# Patient Record
Sex: FEMALE | Race: WHITE | NOT HISPANIC OR LATINO | Employment: FULL TIME | URBAN - METROPOLITAN AREA
[De-identification: names, ages, dates, MRNs, and addresses within clinical notes are randomized per-mention and may not be internally consistent; named-entity substitution may affect disease eponyms.]

---
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
## 2020-04-06 ENCOUNTER — APPOINTMENT (OUTPATIENT)
Dept: CT IMAGING | Facility: HOSPITAL | Age: 20
End: 2020-04-06

## 2020-04-06 ENCOUNTER — HOSPITAL ENCOUNTER (EMERGENCY)
Facility: HOSPITAL | Age: 20
Discharge: HOME | End: 2020-04-06
Attending: EMERGENCY MEDICINE

## 2020-04-06 VITALS
TEMPERATURE: 96.1 F | RESPIRATION RATE: 14 BRPM | OXYGEN SATURATION: 100 % | WEIGHT: 124 LBS | SYSTOLIC BLOOD PRESSURE: 145 MMHG | HEIGHT: 60 IN | HEART RATE: 84 BPM | BODY MASS INDEX: 24.35 KG/M2 | DIASTOLIC BLOOD PRESSURE: 77 MMHG

## 2020-04-06 DIAGNOSIS — R10.31 RIGHT LOWER QUADRANT ABDOMINAL PAIN: Primary | ICD-10-CM

## 2020-04-06 LAB
ALBUMIN SERPL-MCNC: 4.6 G/DL (ref 3.5–5.2)
ALBUMIN/GLOB SERPL: 1.6 G/DL
ALP SERPL-CCNC: 72 U/L (ref 39–117)
ALT SERPL W P-5'-P-CCNC: 10 U/L (ref 1–33)
ANION GAP SERPL CALCULATED.3IONS-SCNC: 10.9 MMOL/L (ref 5–15)
AST SERPL-CCNC: 15 U/L (ref 1–32)
B-HCG UR QL: NEGATIVE
BASOPHILS # BLD AUTO: 0.05 10*3/MM3 (ref 0–0.2)
BASOPHILS NFR BLD AUTO: 0.6 % (ref 0–1.5)
BILIRUB SERPL-MCNC: 0.4 MG/DL (ref 0.2–1.2)
BILIRUB UR QL STRIP: NEGATIVE
BUN BLD-MCNC: 13 MG/DL (ref 6–20)
BUN/CREAT SERPL: 12.7 (ref 7–25)
CALCIUM SPEC-SCNC: 9.9 MG/DL (ref 8.6–10.5)
CHLORIDE SERPL-SCNC: 101 MMOL/L (ref 98–107)
CLARITY UR: CLEAR
CO2 SERPL-SCNC: 25.1 MMOL/L (ref 22–29)
COLOR UR: YELLOW
CREAT BLD-MCNC: 1.02 MG/DL (ref 0.57–1)
DEPRECATED RDW RBC AUTO: 38.5 FL (ref 37–54)
EOSINOPHIL # BLD AUTO: 0.07 10*3/MM3 (ref 0–0.4)
EOSINOPHIL NFR BLD AUTO: 0.8 % (ref 0.3–6.2)
ERYTHROCYTE [DISTWIDTH] IN BLOOD BY AUTOMATED COUNT: 11.9 % (ref 12.3–15.4)
GFR SERPL CREATININE-BSD FRML MDRD: 70 ML/MIN/1.73
GLOBULIN UR ELPH-MCNC: 2.8 GM/DL
GLUCOSE BLD-MCNC: 90 MG/DL (ref 65–99)
GLUCOSE UR STRIP-MCNC: NEGATIVE MG/DL
HCT VFR BLD AUTO: 37.6 % (ref 34–46.6)
HGB BLD-MCNC: 12.6 G/DL (ref 12–15.9)
HGB UR QL STRIP.AUTO: NEGATIVE
IMM GRANULOCYTES # BLD AUTO: 0.02 10*3/MM3 (ref 0–0.05)
IMM GRANULOCYTES NFR BLD AUTO: 0.2 % (ref 0–0.5)
KETONES UR QL STRIP: NEGATIVE
LEUKOCYTE ESTERASE UR QL STRIP.AUTO: NEGATIVE
LYMPHOCYTES # BLD AUTO: 3.34 10*3/MM3 (ref 0.7–3.1)
LYMPHOCYTES NFR BLD AUTO: 40.3 % (ref 19.6–45.3)
MCH RBC QN AUTO: 30.1 PG (ref 26.6–33)
MCHC RBC AUTO-ENTMCNC: 33.5 G/DL (ref 31.5–35.7)
MCV RBC AUTO: 89.7 FL (ref 79–97)
MONOCYTES # BLD AUTO: 0.7 10*3/MM3 (ref 0.1–0.9)
MONOCYTES NFR BLD AUTO: 8.4 % (ref 5–12)
NEUTROPHILS # BLD AUTO: 4.11 10*3/MM3 (ref 1.7–7)
NEUTROPHILS NFR BLD AUTO: 49.7 % (ref 42.7–76)
NITRITE UR QL STRIP: NEGATIVE
NRBC BLD AUTO-RTO: 0 /100 WBC (ref 0–0.2)
PH UR STRIP.AUTO: 7 [PH] (ref 4.5–8)
PLATELET # BLD AUTO: 185 10*3/MM3 (ref 140–450)
PMV BLD AUTO: 10.9 FL (ref 6–12)
POTASSIUM BLD-SCNC: 4.4 MMOL/L (ref 3.5–5.2)
PROT SERPL-MCNC: 7.4 G/DL (ref 6–8.5)
PROT UR QL STRIP: NEGATIVE
RBC # BLD AUTO: 4.19 10*6/MM3 (ref 3.77–5.28)
SODIUM BLD-SCNC: 137 MMOL/L (ref 136–145)
SP GR UR STRIP: 1.02 (ref 1–1.03)
UROBILINOGEN UR QL STRIP: NORMAL
WBC NRBC COR # BLD: 8.29 10*3/MM3 (ref 3.4–10.8)

## 2020-04-06 PROCEDURE — 80053 COMPREHEN METABOLIC PANEL: CPT | Performed by: PHYSICIAN ASSISTANT

## 2020-04-06 PROCEDURE — 25010000002 ONDANSETRON PER 1 MG: Performed by: PHYSICIAN ASSISTANT

## 2020-04-06 PROCEDURE — 96375 TX/PRO/DX INJ NEW DRUG ADDON: CPT

## 2020-04-06 PROCEDURE — 99284 EMERGENCY DEPT VISIT MOD MDM: CPT

## 2020-04-06 PROCEDURE — 25010000002 FENTANYL CITRATE (PF) 100 MCG/2ML SOLUTION: Performed by: EMERGENCY MEDICINE

## 2020-04-06 PROCEDURE — 85025 COMPLETE CBC W/AUTO DIFF WBC: CPT | Performed by: PHYSICIAN ASSISTANT

## 2020-04-06 PROCEDURE — 74177 CT ABD & PELVIS W/CONTRAST: CPT

## 2020-04-06 PROCEDURE — 96374 THER/PROPH/DIAG INJ IV PUSH: CPT

## 2020-04-06 PROCEDURE — 25010000002 IOPAMIDOL 61 % SOLUTION: Performed by: EMERGENCY MEDICINE

## 2020-04-06 PROCEDURE — 99284 EMERGENCY DEPT VISIT MOD MDM: CPT | Performed by: PHYSICIAN ASSISTANT

## 2020-04-06 PROCEDURE — 81025 URINE PREGNANCY TEST: CPT | Performed by: PHYSICIAN ASSISTANT

## 2020-04-06 PROCEDURE — 81003 URINALYSIS AUTO W/O SCOPE: CPT | Performed by: PHYSICIAN ASSISTANT

## 2020-04-06 RX ORDER — FENTANYL CITRATE 50 UG/ML
25 INJECTION, SOLUTION INTRAMUSCULAR; INTRAVENOUS ONCE
Status: COMPLETED | OUTPATIENT
Start: 2020-04-06 | End: 2020-04-06

## 2020-04-06 RX ORDER — ONDANSETRON 4 MG/1
4 TABLET, ORALLY DISINTEGRATING ORAL 4 TIMES DAILY PRN
Qty: 20 TABLET | Refills: 0 | Status: SHIPPED | OUTPATIENT
Start: 2020-04-06 | End: 2020-07-29

## 2020-04-06 RX ORDER — SODIUM CHLORIDE 0.9 % (FLUSH) 0.9 %
10 SYRINGE (ML) INJECTION AS NEEDED
Status: DISCONTINUED | OUTPATIENT
Start: 2020-04-06 | End: 2020-04-06 | Stop reason: HOSPADM

## 2020-04-06 RX ORDER — ONDANSETRON 2 MG/ML
4 INJECTION INTRAMUSCULAR; INTRAVENOUS ONCE
Status: COMPLETED | OUTPATIENT
Start: 2020-04-06 | End: 2020-04-06

## 2020-04-06 RX ORDER — METHYLPREDNISOLONE SODIUM SUCCINATE 125 MG/2ML
125 INJECTION, POWDER, LYOPHILIZED, FOR SOLUTION INTRAMUSCULAR; INTRAVENOUS ONCE
Status: DISCONTINUED | OUTPATIENT
Start: 2020-04-06 | End: 2020-04-06

## 2020-04-06 RX ADMIN — ONDANSETRON 4 MG: 2 INJECTION, SOLUTION INTRAMUSCULAR; INTRAVENOUS at 20:35

## 2020-04-06 RX ADMIN — FENTANYL CITRATE 25 MCG: 50 INJECTION, SOLUTION INTRAMUSCULAR; INTRAVENOUS at 20:35

## 2020-04-06 RX ADMIN — IOPAMIDOL 100 ML: 612 INJECTION, SOLUTION INTRAVENOUS at 21:08

## 2020-04-07 NOTE — ED PROVIDER NOTES
Subjective   History of Present Illness  History of Present Illness    Chief complaint: abd pain    Location: RLQ without radiation    Quality/Severity: Pressure.  Varies.  Currently moderate    Timing/Duration: 2 days.  Was intermittent, now constant all day today    Modifying Factors: Movement makes worse.  Nothing makes better.    Associated Symptoms: Positive nausea and vomiting x1.  Denies hematemesis. denies diarrhea.  Positive urinary Markus and urgency.  Denies dysuria or hematuria.  Denies flank pain.  Denies chest pain or shortness of breath.  Denies cough.  Denies fevers or chills.  Denies vaginal discharge.    Narrative: 18-year-old female presents with right lower quadrant pain as above.  She denies any travel.  She denies any risk for STDs and declines pelvic exam.    Review of Systems   Constitutional: Negative.  Negative for chills and fever.   HENT: Negative.    Respiratory: Negative.  Negative for cough and shortness of breath.    Cardiovascular: Negative.  Negative for chest pain.   Gastrointestinal: Positive for abdominal pain, nausea and vomiting. Negative for blood in stool and diarrhea.   Genitourinary: Positive for frequency and urgency. Negative for dysuria, hematuria, vaginal bleeding and vaginal discharge.   Musculoskeletal: Negative.    Skin: Negative.    Neurological: Negative.    Hematological: Negative.    Psychiatric/Behavioral: Negative.    All other systems reviewed and are negative.      Past Medical History:   Diagnosis Date   • Anxiety    • Cervical dystonia     dx about 1 year ago (2017)   • Concussion     3/2018, hit left side of head on door   • Hypertension    • Torticollis 10/2016       No Known Allergies    Past Surgical History:   Procedure Laterality Date   • HERNIA REPAIR  2005       Family History   Problem Relation Age of Onset   • No Known Problems Mother    • No Known Problems Father        Social History     Socioeconomic History   • Marital status: Significant  "Other     Spouse name: Not on file   • Number of children: Not on file   • Years of education: Not on file   • Highest education level: Not on file   Tobacco Use   • Smoking status: Current Every Day Smoker     Types: Electronic Cigarette   • Smokeless tobacco: Never Used   • Tobacco comment: \"I stopped vaping yesterday\". States stoppede in Dec   Substance and Sexual Activity   • Alcohol use: No   • Drug use: No   • Sexual activity: Defer       Current Facility-Administered Medications:   •  fentaNYL citrate (PF) (SUBLIMAZE) injection 25 mcg, 25 mcg, Intravenous, Once, Roly Cabello MD  •  ondansetron (ZOFRAN) injection 4 mg, 4 mg, Intravenous, Once, Renae Mace PA-C  •  Insert peripheral IV, , , Once **AND** sodium chloride 0.9 % flush 10 mL, 10 mL, Intravenous, PRN, Renae Mace PA-C  No current outpatient medications on file.        Objective   Physical Exam  Vitals:    04/06/20 2005   BP: 125/79   Pulse: 86   Resp: 12   Temp: 96.1 °F (35.6 °C)   SpO2: 100%     GENERAL: Alert and oriented ×4.  No apparent distress.  SKIN: Warm, pink and dry  HEENT: Atraumatic normocephalic  LUNGS: Clear to auscultation bilaterally without wheezes, rales or rhonchi  CARDIAC: Regular rate and rhythm.  S1 and S2.  No murmurs, rubs or gallops.  ABD: Soft, mildly tender right lower quadrant.  No guarding or rebound tenderness.  Negative Rovsing's.  Negative heel jar.  Normal bowel sounds.  No CVA tenderness.  PELVIC: declined.  M/S: MAEW, no deformity  PSYCH: Normal mood and affect    Procedures           ED Course  ED Course as of Apr 06 2130 Mon Apr 06, 2020 2017 Zofran,  fentanyl given    [[place]]   2055 stable   Creatinine(!): 1.02 [KY]   2124 Patient is feeling better.  Pain has resolved.  Abdominal exam benign.  Educated patient.    Discussed pertinent labs and imaging findings with the patient/family.  Patient/Family voiced understanding of need to follow-up for recheck, further testing as needed.  Return to the " emergency Department warnings were given.    Discharged with Zofran    [KY]      ED Course User Index  [KY] Renae Mace PA-C      Reviewed CT a/p. Independently viewed by me. Interpreted by radiologist. Discussed with pt.  Ct Abdomen Pelvis With Contrast    Result Date: 4/6/2020  Narrative: PROCEDURE: CT Abdomen Pelvis W INDICATION:   Abdominal pain. RLQ pain x 2 days, n/v x 1 today. Has hx of kidney stones. Normal WBC, negative for hematuria, HCG negative/LMP 3-22-20. Sx hx: hernia repair. PPE documentation: tech wore gloves, surgical mask and goggles, pt wore surgical mask TECHNIQUE:  CT  abdomen and pelvis are performed after IV contrast.  No oral contrast given by request.  Coronal and sagittal reconstructions were obtained.  Radiation dose reduction techniques included automated exposure control or exposure modulation based on body size. Count of known CT and cardiac nuc med studies performed in previous 12 months: 0. .  COMPARISON: December 2018 FINDINGS: Artifacts: Mild motion artifact present.. Limited bowel assessment without enteric contrast. Mesentery/peritoneal cavity:  No inflammatory changes or stranding identified. Appendix normal Liver:  Within normal limits  Spleen:  Within normal limits  Kidneys: Marked scarring of the left renal cortex noted. Area of decreased enhancement in the upper pole posterior cortex left kidney is compatible with a small cyst or calyceal diverticulum. Minor scarring of the right kidney seen. Left kidney is atrophic measuring just under 8 cm in length, right kidney 9.3 cm in length. Pancreas:  Within normal limits  Gallbladder/ bile ducts:  No gross gallstones seen.  No biliary dilatation noted. Adrenal glands:  Nonenlarged.  Pelvis: Uterus and adnexa are within normal limits for age. Small amount of free fluid seen in the pelvis, etiology uncertain Prior to the Osseous structures:  No acute osseous abnormality seen.      Impression: No acute process. Small amount of  free fluid in the pelvis is nonspecific and could be physiologic or related to rupture of an ovarian hemorrhagic cyst.   Signer Name: Juanita Hull MD  Signed: 4/6/2020 8:21 PM  Workstation Name: [id number]  [facility]          Results for orders placed or performed during the hospital encounter of 04/06/20   Comprehensive Metabolic Panel   Result Value Ref Range    Glucose 90 65 - 99 mg/dL    BUN 13 6 - 20 mg/dL    Creatinine 1.02 (H) 0.57 - 1.00 mg/dL    Sodium 137 136 - 145 mmol/L    Potassium 4.4 3.5 - 5.2 mmol/L    Chloride 101 98 - 107 mmol/L    CO2 25.1 22.0 - 29.0 mmol/L    Calcium 9.9 8.6 - 10.5 mg/dL    Total Protein 7.4 6.0 - 8.5 g/dL    Albumin 4.60 3.50 - 5.20 g/dL    ALT (SGPT) 10 1 - 33 U/L    AST (SGOT) 15 1 - 32 U/L    Alkaline Phosphatase 72 39 - 117 U/L    Total Bilirubin 0.4 0.2 - 1.2 mg/dL    eGFR Non African Amer 70 >60 mL/min/1.73    Globulin 2.8 gm/dL    A/G Ratio 1.6 g/dL    BUN/Creatinine Ratio 12.7 7.0 - 25.0    Anion Gap 10.9 5.0 - 15.0 mmol/L   Pregnancy, Urine - Urine, Clean Catch   Result Value Ref Range    HCG, Urine QL Negative Negative   Urinalysis With Culture If Indicated - Urine, Clean Catch   Result Value Ref Range    Color, UA Yellow Yellow, Straw    Appearance, UA Clear Clear    pH, UA 7.0 4.5 - 8.0    Specific Gravity, UA 1.020 1.003 - 1.030    Glucose, UA Negative Negative    Ketones, UA Negative Negative    Bilirubin, UA Negative Negative    Blood, UA Negative Negative    Protein, UA Negative Negative    Leuk Esterase, UA Negative Negative    Nitrite, UA Negative Negative    Urobilinogen, UA 0.2 E.U./dL 0.2 - 1.0 E.U./dL   CBC Auto Differential   Result Value Ref Range    WBC 8.29 3.40 - 10.80 10*3/mm3    RBC 4.19 3.77 - 5.28 10*6/mm3    Hemoglobin 12.6 12.0 - 15.9 g/dL    Hematocrit 37.6 34.0 - 46.6 %    MCV 89.7 79.0 - 97.0 fL    MCH 30.1 26.6 - 33.0 pg    MCHC 33.5 31.5 - 35.7 g/dL    RDW 11.9 (L) 12.3 - 15.4 %    RDW-SD 38.5 37.0 - 54.0 fl     MPV 10.9 6.0 - 12.0 fL    Platelets 185 140 - 450 10*3/mm3    Neutrophil % 49.7 42.7 - 76.0 %    Lymphocyte % 40.3 19.6 - 45.3 %    Monocyte % 8.4 5.0 - 12.0 %    Eosinophil % 0.8 0.3 - 6.2 %    Basophil % 0.6 0.0 - 1.5 %    Immature Grans % 0.2 0.0 - 0.5 %    Neutrophils, Absolute 4.11 1.70 - 7.00 10*3/mm3    Lymphocytes, Absolute 3.34 (H) 0.70 - 3.10 10*3/mm3    Monocytes, Absolute 0.70 0.10 - 0.90 10*3/mm3    Eosinophils, Absolute 0.07 0.00 - 0.40 10*3/mm3    Basophils, Absolute 0.05 0.00 - 0.20 10*3/mm3    Immature Grans, Absolute 0.02 0.00 - 0.05 10*3/mm3    nRBC 0.0 0.0 - 0.2 /100 WBC                 MDM  My differential diagnosis for abdominal pain includes but is not limited to:  Gastritis, gastroenteritis, peptic ulcer disease, GERD, esophageal perforation, acute appendicitis, mesenteric adenitis, Meckel’s diverticulum, epiploic appendagitis, diverticulitis, colon cancer, ulcerative colitis, Crohn’s disease, intussusception, small bowel obstruction, adhesions, ischemic bowel, perforated viscus, ileus, obstipation, biliary colic, cholecystitis, cholelithiasis, Ebenezer-Norberto David, hepatitis, pancreatitis, common bile duct obstruction, cholangitis, bile leak, splenic trauma, splenic rupture, splenic infarction, splenic abscess, abdominal abscess, ascites, spontaneous bacterial peritonitis, hernia, UTI, cystitis,ureterolithiasis, urinary obstruction, ovarian cyst, torsion, pregnancy, ectopic pregnancy, PID, pelvic abscess, mittelschmerz, endometriosis, AAA, myocardial infarction, pneumonia, cancer, porphyria, DKA, medications, sickle cell, viral syndrome, zoster    Final diagnoses:   Right lower quadrant abdominal pain       Dictated utilizing Dragon dictation       Renae Mace PA-C  04/06/20 [id number]

## 2020-04-07 NOTE — DISCHARGE INSTRUCTIONS
Return to the emergency department with worsening symptoms, uncontrolled pain, inability to tolerate oral liquids, fever greater than 101°F not controlled by Tylenol or as needed with emergent concerns.    Clear liquid diet.  Advance to bland as tolerated, then regular

## 2020-04-07 NOTE — ED NOTES
Patient education thoroughly reviewed.  Patient able to teach back new medications, how to obtain and take as well as potential side effects.  Patient able to teach back follow up recommendations and signs and symptoms to seek further medical care.      Patient discharged in stable condition per self ambulation to private transportation        Gustabo Terrazas RN  04/06/20 5827

## 2021-04-25 ENCOUNTER — APPOINTMENT (OUTPATIENT)
Dept: CT IMAGING | Facility: HOSPITAL | Age: 21
End: 2021-04-25

## 2021-04-25 ENCOUNTER — HOSPITAL ENCOUNTER (EMERGENCY)
Facility: HOSPITAL | Age: 21
Discharge: HOME | End: 2021-04-25
Attending: EMERGENCY MEDICINE | Admitting: EMERGENCY MEDICINE

## 2021-04-25 VITALS
DIASTOLIC BLOOD PRESSURE: 93 MMHG | BODY MASS INDEX: 21.22 KG/M2 | SYSTOLIC BLOOD PRESSURE: 127 MMHG | HEIGHT: 64 IN | HEART RATE: 73 BPM | TEMPERATURE: 98.1 F | RESPIRATION RATE: 14 BRPM | WEIGHT: 124.3 LBS | OXYGEN SATURATION: 98 %

## 2021-04-25 DIAGNOSIS — R55 SYNCOPE AND COLLAPSE: Primary | ICD-10-CM

## 2021-04-25 LAB
ALBUMIN SERPL-MCNC: 4.4 G/DL (ref 3.5–5.2)
ALBUMIN/GLOB SERPL: 1.8 G/DL
ALP SERPL-CCNC: 76 U/L (ref 39–117)
ALT SERPL W P-5'-P-CCNC: 20 U/L (ref 1–33)
ANION GAP SERPL CALCULATED.3IONS-SCNC: 8.5 MMOL/L (ref 5–15)
AST SERPL-CCNC: 21 U/L (ref 1–32)
B-HCG UR QL: NEGATIVE
BASOPHILS # BLD AUTO: 0.04 10*3/MM3 (ref 0–0.2)
BASOPHILS NFR BLD AUTO: 0.6 % (ref 0–1.5)
BILIRUB SERPL-MCNC: 0.3 MG/DL (ref 0–1.2)
BILIRUB UR QL STRIP: NEGATIVE
BUN SERPL-MCNC: 20 MG/DL (ref 6–20)
BUN/CREAT SERPL: 17.1 (ref 7–25)
CALCIUM SPEC-SCNC: 9.4 MG/DL (ref 8.6–10.5)
CHLORIDE SERPL-SCNC: 105 MMOL/L (ref 98–107)
CLARITY UR: CLEAR
CO2 SERPL-SCNC: 27.5 MMOL/L (ref 22–29)
COLOR UR: YELLOW
CREAT SERPL-MCNC: 1.17 MG/DL (ref 0.57–1)
DEPRECATED RDW RBC AUTO: 42.6 FL (ref 37–54)
EOSINOPHIL # BLD AUTO: 0.06 10*3/MM3 (ref 0–0.4)
EOSINOPHIL NFR BLD AUTO: 0.9 % (ref 0.3–6.2)
ERYTHROCYTE [DISTWIDTH] IN BLOOD BY AUTOMATED COUNT: 12.5 % (ref 12.3–15.4)
GFR SERPL CREATININE-BSD FRML MDRD: 59 ML/MIN/1.73
GLOBULIN UR ELPH-MCNC: 2.4 GM/DL
GLUCOSE SERPL-MCNC: 90 MG/DL (ref 65–99)
GLUCOSE UR STRIP-MCNC: NEGATIVE MG/DL
HCT VFR BLD AUTO: 37.8 % (ref 34–46.6)
HGB BLD-MCNC: 12.2 G/DL (ref 12–15.9)
HGB UR QL STRIP.AUTO: NEGATIVE
IMM GRANULOCYTES # BLD AUTO: 0.01 10*3/MM3 (ref 0–0.05)
IMM GRANULOCYTES NFR BLD AUTO: 0.1 % (ref 0–0.5)
KETONES UR QL STRIP: NEGATIVE
LEUKOCYTE ESTERASE UR QL STRIP.AUTO: NEGATIVE
LYMPHOCYTES # BLD AUTO: 2.27 10*3/MM3 (ref 0.7–3.1)
LYMPHOCYTES NFR BLD AUTO: 32.8 % (ref 19.6–45.3)
MCH RBC QN AUTO: 30 PG (ref 26.6–33)
MCHC RBC AUTO-ENTMCNC: 32.3 G/DL (ref 31.5–35.7)
MCV RBC AUTO: 93.1 FL (ref 79–97)
MONOCYTES # BLD AUTO: 0.52 10*3/MM3 (ref 0.1–0.9)
MONOCYTES NFR BLD AUTO: 7.5 % (ref 5–12)
NEUTROPHILS NFR BLD AUTO: 4.02 10*3/MM3 (ref 1.7–7)
NEUTROPHILS NFR BLD AUTO: 58.1 % (ref 42.7–76)
NITRITE UR QL STRIP: NEGATIVE
NRBC BLD AUTO-RTO: 0 /100 WBC (ref 0–0.2)
PH UR STRIP.AUTO: 7 [PH] (ref 4.5–8)
PLATELET # BLD AUTO: 218 10*3/MM3 (ref 140–450)
PMV BLD AUTO: 11.2 FL (ref 6–12)
POTASSIUM SERPL-SCNC: 3.9 MMOL/L (ref 3.5–5.2)
PROT SERPL-MCNC: 6.8 G/DL (ref 6–8.5)
PROT UR QL STRIP: NEGATIVE
RBC # BLD AUTO: 4.06 10*6/MM3 (ref 3.77–5.28)
SODIUM SERPL-SCNC: 141 MMOL/L (ref 136–145)
SP GR UR STRIP: 1.02 (ref 1–1.03)
UROBILINOGEN UR QL STRIP: NORMAL
WBC # BLD AUTO: 6.92 10*3/MM3 (ref 3.4–10.8)

## 2021-04-25 PROCEDURE — 93005 ELECTROCARDIOGRAM TRACING: CPT | Performed by: EMERGENCY MEDICINE

## 2021-04-25 PROCEDURE — 99283 EMERGENCY DEPT VISIT LOW MDM: CPT

## 2021-04-25 PROCEDURE — 80053 COMPREHEN METABOLIC PANEL: CPT | Performed by: PHYSICIAN ASSISTANT

## 2021-04-25 PROCEDURE — 81025 URINE PREGNANCY TEST: CPT | Performed by: PHYSICIAN ASSISTANT

## 2021-04-25 PROCEDURE — 81003 URINALYSIS AUTO W/O SCOPE: CPT | Performed by: PHYSICIAN ASSISTANT

## 2021-04-25 PROCEDURE — 85025 COMPLETE CBC W/AUTO DIFF WBC: CPT | Performed by: PHYSICIAN ASSISTANT

## 2021-04-25 PROCEDURE — 93010 ELECTROCARDIOGRAM REPORT: CPT | Performed by: INTERNAL MEDICINE

## 2021-04-25 PROCEDURE — 70450 CT HEAD/BRAIN W/O DYE: CPT

## 2021-04-25 NOTE — ED NOTES
Pt reports that she does not know the names or doses of her medications     Caitie Milner, RN  04/25/21 1932

## 2021-04-25 NOTE — ED NOTES
Unable to verify medications. Pt fills at [facility] in [place] but that pharmacy is closed at this time.     Caitie Milner, RN  04/25/21 1938

## 2021-04-26 LAB — QT INTERVAL: 352 MS

## 2021-04-26 NOTE — ED PROVIDER NOTES
EMERGENCY DEPARTMENT ENCOUNTER      Room Number: 12/12    History is provided by the patient, no translation services needed    HPI:    Chief complaint: Syncope    Location: At work    Quality/Severity:      Timing/Duration: Today/twice    Modifying Factors: After history of a tic    Associated Symptoms: Headache    Narrative: Pt is a 20 y.o. female who presents complaining of 2 syncopal episodes today.  Patient states that one was while she was playing pool.  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  Patient states that she did not fall and hit her head due to the fact that someone caught her each time.  Patient states that she has a history of anxiety which induces a tic which caused her to pass out.  Patient is also complaining of a headache that began today.      PMD: Preston Coombs MD    REVIEW OF SYSTEMS  Review of Systems   Constitutional: Negative for chills and fever.   Eyes: Negative for pain and visual disturbance.   Respiratory: Negative for cough and shortness of breath.    Cardiovascular: Negative for chest pain and leg swelling.   Gastrointestinal: Negative for abdominal pain, constipation, diarrhea, nausea and vomiting.   Genitourinary: Negative for dysuria and flank pain.   Musculoskeletal: Negative for arthralgias and myalgias.   Skin: Negative for rash and wound.   Neurological: Positive for syncope and headaches. Negative for dizziness.   Psychiatric/Behavioral: Negative for suicidal ideas. The patient is not nervous/anxious.          PAST MEDICAL HISTORY  Active Ambulatory Problems     Diagnosis Date Noted   • No Active Ambulatory Problems     Resolved Ambulatory Problems     Diagnosis Date Noted   • No Resolved Ambulatory Problems     Past Medical History:   Diagnosis Date   • Anxiety    • Cervical dystonia    • Concussion    • Hypertension    • Torticollis 10/2016   • Tourette's        PAST SURGICAL HISTORY  Past Surgical History:   Procedure Laterality Date   • HERNIA  "REPAIR  2005       FAMILY HISTORY  Family History   Problem Relation Age of Onset   • No Known Problems Mother    • No Known Problems Father        SOCIAL HISTORY  Social History     Socioeconomic History   • Marital status: Significant Other     Spouse name: Not on file   • Number of children: Not on file   • Years of education: Not on file   • Highest education level: Not on file   Tobacco Use   • Smoking status: Former Smoker     Types: Electronic Cigarette   • Smokeless tobacco: Never Used   • Tobacco comment: \"I stopped vaping yesterday\". States stoppede in Dec   Vaping Use   • Vaping Use: Never used   Substance and Sexual Activity   • Alcohol use: No   • Drug use: No   • Sexual activity: Defer       ALLERGIES  Patient has no known allergies.    No current facility-administered medications for this encounter.  No current outpatient medications on file.    PHYSICAL EXAM  ED Triage Vitals   Temp Heart Rate Resp BP SpO2   04/25/21 1929 04/25/21 1929 04/25/21 1929 04/25/21 1929 04/25/21 1929   98.1 °F (36.7 °C) 95 14 144/94 100 %      Temp src Heart Rate Source Patient Position BP Location FiO2 (%)   04/25/21 1929 04/25/21 1929 04/25/21 1933 04/25/21 1929 --   Oral Monitor Lying Right arm        Physical Exam  Vitals and nursing note reviewed.   HENT:      Head: Normocephalic and atraumatic.   Eyes:      Conjunctiva/sclera: Conjunctivae normal.   Cardiovascular:      Rate and Rhythm: Normal rate and regular rhythm.      Heart sounds: Normal heart sounds.   Pulmonary:      Effort: Pulmonary effort is normal. No respiratory distress.      Breath sounds: Normal breath sounds.   Abdominal:      General: Bowel sounds are normal. There is no distension.      Palpations: Abdomen is soft.      Tenderness: There is no abdominal tenderness.   Musculoskeletal:         General: Normal range of motion.      Cervical back: Normal range of motion and neck supple.   Skin:     General: Skin is warm and dry.   Neurological:      " Mental Status: She is alert and oriented to person, place, and time.   Psychiatric:         Mood and Affect: Mood and affect normal.           LAB RESULTS  Lab Results (last 24 hours)     Procedure Component Value Units Date/Time    CBC & Differential [[id number]]  (Normal) Collected: 04/25/21 1957    Specimen: Blood Updated: 04/25/21 2003    Narrative:      The following orders were created for panel order CBC & Differential.  Procedure                               Abnormality         Status                     ---------                               -----------         ------                     CBC Auto Differential[[id number]]        Normal              Final result                 Please view results for these tests on the individual orders.    Comprehensive Metabolic Panel [[id number]]  (Abnormal) Collected: 04/25/21 1957    Specimen: Blood Updated: 04/25/21 2022     Glucose 90 mg/dL      BUN 20 mg/dL      Creatinine 1.17 mg/dL      Sodium 141 mmol/L      Potassium 3.9 mmol/L      Chloride 105 mmol/L      CO2 27.5 mmol/L      Calcium 9.4 mg/dL      Total Protein 6.8 g/dL      Albumin 4.40 g/dL      ALT (SGPT) 20 U/L      AST (SGOT) 21 U/L      Alkaline Phosphatase 76 U/L      Total Bilirubin 0.3 mg/dL      eGFR Non African Amer 59 mL/min/1.73      Globulin 2.4 gm/dL      A/G Ratio 1.8 g/dL      BUN/Creatinine Ratio 17.1     Anion Gap 8.5 mmol/L     Narrative:      GFR Normal >60  Chronic Kidney Disease <60  Kidney Failure <15      CBC Auto Differential [[id number]]  (Normal) Collected: 04/25/21 1957    Specimen: Blood Updated: 04/25/21 2003     WBC 6.92 10*3/mm3      RBC 4.06 10*6/mm3      Hemoglobin 12.2 g/dL      Hematocrit 37.8 %      MCV 93.1 fL      MCH 30.0 pg      MCHC 32.3 g/dL      RDW 12.5 %      RDW-SD 42.6 fl      MPV 11.2 fL      Platelets 218 10*3/mm3      Neutrophil % 58.1 %      Lymphocyte % 32.8 %      Monocyte % 7.5 %      Eosinophil % 0.9 %      Basophil % 0.6 %      Immature Grans % 0.1 %       Neutrophils, Absolute 4.02 10*3/mm3      Lymphocytes, Absolute 2.27 10*3/mm3      Monocytes, Absolute 0.52 10*3/mm3      Eosinophils, Absolute 0.06 10*3/mm3      Basophils, Absolute 0.04 10*3/mm3      Immature Grans, Absolute 0.01 10*3/mm3      nRBC 0.0 /100 WBC     Urinalysis With Microscopic If Indicated (No Culture) - Urine, Clean Catch [[id number]]  (Normal) Collected: 04/25/21 2004    Specimen: Urine, Clean Catch Updated: 04/25/21 2009     Color, UA Yellow     Appearance, UA Clear     pH, UA 7.0     Specific Gravity, UA 1.020     Glucose, UA Negative     Ketones, UA Negative     Bilirubin, UA Negative     Blood, UA Negative     Protein, UA Negative     Leuk Esterase, UA Negative     Nitrite, UA Negative     Urobilinogen, UA 0.2 E.U./dL    Narrative:      Urine microscopic not indicated.    Pregnancy, Urine - Urine, Clean Catch [[id number]]  (Normal) Collected: 04/25/21 2004    Specimen: Urine, Clean Catch Updated: 04/25/21 2012     HCG, Urine QL Negative            I ordered the above labs and reviewed the results    RADIOLOGY  CT Head Without Contrast    Result Date: 4/25/2021  CT Head WO HISTORY: Multiple syncopal episodes today with loss of consciousness. 1 minute TECHNIQUE: Axial unenhanced head CT. Radiation dose reduction techniques included automated exposure control or exposure modulation based on body size. Count of known CT and cardiac nuc med studies performed in previous 12 months: 1. Time of scan: 8:37 PM COMPARISON: None. FINDINGS: No intracranial hemorrhage, mass, or infarct. No hydrocephalus or extra-axial fluid collection. Brain parenchymal density is normal. The skull base, calvarium, and extracranial soft tissues are normal.     Normal, negative unenhanced head CT. Signer Name: Delon Mast MD  Signed: 4/25/2021 8:59 PM  Workstation Name: [facility]  [facility]      I ordered the above radiologic testing and reviewed the results    PROCEDURES  Procedures      PROGRESS  AND CONSULTS  ED Course as of Apr 25 2147   Sun Apr 25, 2021   1950 EKG         EKG time / Interpretation time: 1940/1945  Rhythm/Rate: Sinus rhythm/79   DE: 126  QRS, axis: 71, normal axis  QTc 404  ST and T waves: No significant ST elevation or depression, no T wave abnormality  EKG Tracing Interpreted Contemporaneously by me, independently viewed by me and MD.  unchanged compared to prior 12/27/2019      [GT]   2027 Creatinine(!): 1.17 [GT]   2144 20-year-old female presents to the ED with complaints of a syncopal episode x2 today.  She is also complaining of a headache today.  After history and physical exam patient was noted to have normal vital signs.  Patient had no neurological deficits that were noted.  Laboratory studies showed that patient's electrolytes were unremarkable except for patient's creatinine was slightly elevated at 1.17.  Patient's white blood cell count was 6.19.  Patient was noted to have a negative pregnancy test.  Patient had no signs of urinary tract infection.  CT of patient's head showed no acute intracranial disease processes.  While in the ED patient states that she felt better and was requesting discharge.  Patient will be discharged home.  Discussed with patient the importance of her not operating any heavy machinery such as driving.  Also instructed patient that she would need to follow-up with her PCP.  Patient was given a work note.  Also instructed patient that if her symptoms worsen that she should return to the ED.  Patient expressed verbal understanding of plan of care.    [GT]      ED Course User Index  [GT] Deyanira Grady, VALENTINE           MEDICAL DECISION MAKING    MDM       DIAGNOSIS  Final diagnoses:   Syncope and collapse       Latest Documented Vital Signs:  As of 21:46 EDT  BP- 127/93 HR- 73 Temp- 98.1 °F (36.7 °C) (Oral) O2 sat- 98%    DISPOSITION  Discharged home        Discussed pertinent findings with the patient/family.  Patient/Family voiced understanding of  need to follow-up for recheck and further testing as needed.  Return to the Emergency Department warnings were given.         Medication List      No changes were made to your prescriptions during this visit.             Follow-up Information     Preston Coombs MD. Call in 1 day.    Specialty: General Practice  Why: To schedule a follow up appointment  Contact information:  [address]  [facility] [id number] [phone]                     Dictated utilizing Dragon dictation     Deyanira Grady PA-C  04/25/21 5234

## 2021-11-24 ENCOUNTER — HOSPITAL ENCOUNTER (EMERGENCY)
Facility: HOSPITAL | Age: 21
Discharge: HOME | End: 2021-11-24
Attending: EMERGENCY MEDICINE | Admitting: EMERGENCY MEDICINE

## 2021-11-24 ENCOUNTER — APPOINTMENT (OUTPATIENT)
Dept: GENERAL RADIOLOGY | Facility: HOSPITAL | Age: 21
End: 2021-11-24

## 2021-11-24 VITALS
HEIGHT: 60 IN | HEART RATE: 78 BPM | TEMPERATURE: 98.6 F | RESPIRATION RATE: 16 BRPM | BODY MASS INDEX: 25.4 KG/M2 | WEIGHT: 129.4 LBS | SYSTOLIC BLOOD PRESSURE: 138 MMHG | OXYGEN SATURATION: 100 % | DIASTOLIC BLOOD PRESSURE: 90 MMHG

## 2021-11-24 DIAGNOSIS — R07.89 MUSCULOSKELETAL CHEST PAIN: Primary | ICD-10-CM

## 2021-11-24 LAB — QT INTERVAL: 367 MS

## 2021-11-24 PROCEDURE — 99282 EMERGENCY DEPT VISIT SF MDM: CPT

## 2021-11-24 PROCEDURE — 71046 X-RAY EXAM CHEST 2 VIEWS: CPT

## 2021-11-24 PROCEDURE — 99282 EMERGENCY DEPT VISIT SF MDM: CPT | Performed by: PHYSICIAN ASSISTANT

## 2021-11-24 PROCEDURE — 93010 ELECTROCARDIOGRAM REPORT: CPT | Performed by: INTERNAL MEDICINE

## 2021-11-24 PROCEDURE — 93005 ELECTROCARDIOGRAM TRACING: CPT

## 2021-11-24 NOTE — ED PROVIDER NOTES
EMERGENCY DEPARTMENT ENCOUNTER      Room Number: 11/11    History is provided by the patient, no translation services needed    HPI:    Chief complaint: chest pain and arm numbness     Narrative: Pt is a 21 y.o. female who presents complaining of left-sided chest/shoulder discomfort and intermittent numbness and patient's arms. Patient reports she noticed some left chest/shoulder discomfort today while at work. 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 Reports the pain has stayed in that area. Reports it is worse when she takes in a deep breath however it is always constant at rest and with exertion. Regards to the numbness in her hands, she reports that in her left arm she will experience some numbness in her fourth and fifth digit however this will resolve with position changes. She also feels some general numbness around her elbow but states this comes and goes as well. She denies any cough, fevers or other symptoms consistent with coronavirus. She states she is unvaccinated. No one else at home is currently sick. No history of heart conditions. No syncope. No other complaints at this time.      PMD: Nandini Phelps MD    REVIEW OF SYSTEMS  Review of Systems  Complete review of systems performed otherwise negative except pertinent positives per HPI.    PAST MEDICAL HISTORY  Active Ambulatory Problems     Diagnosis Date Noted   • No Active Ambulatory Problems     Resolved Ambulatory Problems     Diagnosis Date Noted   • No Resolved Ambulatory Problems     Past Medical History:   Diagnosis Date   • Anxiety    • Cervical dystonia    • Concussion    • Hypertension    • Torticollis 10/2016   • Tourette's        PAST SURGICAL HISTORY  Past Surgical History:   Procedure Laterality Date   • HERNIA REPAIR  2005       FAMILY HISTORY  Family History   Problem Relation Age of Onset   • No Known Problems Mother    • No Known Problems Father        SOCIAL HISTORY  Social History     Socioeconomic History   •  I have seen the patient.  I have reviewed the notes, assessments, and/or procedures performed by Dr. Chambers, I concur with her/his documentation and assessment and plan for Venkata Granado.       This document has been electronically signed by Isha Key MD on January 3, 2017 1:57 PM      "Marital status: Significant Other   Tobacco Use   • Smoking status: Current Every Day Smoker     Types: Electronic Cigarette   • Smokeless tobacco: Never Used   • Tobacco comment: \"I stopped vaping yesterday\". States stoppede in Dec   Vaping Use   • Vaping Use: Every day   • Substances: Nicotine, Flavoring   • Devices: Disposable   Substance and Sexual Activity   • Alcohol use: No   • Drug use: No   • Sexual activity: Defer       ALLERGIES  Patient has no known allergies.    No current facility-administered medications for this encounter.    Current Outpatient Medications:   •  ARIPiprazole (ABILIFY) 15 MG tablet, Take 7.5 mg by mouth Every Morning., Disp: , Rfl:   •  FLUoxetine (PROzac) 40 MG capsule, TAKE ONE CAPSULE BY MOUTH EVERY MORNING AFTER FINISHING 10 DAYS OF 20 MG DOSE, Disp: , Rfl:     PHYSICAL EXAM  ED Triage Vitals [11/24/21 1845]   Temp Heart Rate Resp BP SpO2   98.6 °F (37 °C) 81 16 146/99 100 %      Temp src Heart Rate Source Patient Position BP Location FiO2 (%)   Oral Monitor Lying Right arm --       Physical Exam  Vitals and nursing note reviewed.   Constitutional:       Appearance: Normal appearance. She is normal weight.   HENT:      Head: Normocephalic and atraumatic.      Nose: Nose normal.      Mouth/Throat:      Mouth: Mucous membranes are moist.   Eyes:      Extraocular Movements: Extraocular movements intact.      Conjunctiva/sclera: Conjunctivae normal.      Pupils: Pupils are equal, round, and reactive to light.   Cardiovascular:      Rate and Rhythm: Normal rate and regular rhythm.      Pulses:           Radial pulses are 2+ on the right side and 2+ on the left side.      Heart sounds: Normal heart sounds.   Pulmonary:      Effort: Pulmonary effort is normal.      Breath sounds: No wheezing.   Chest:      Chest wall: Tenderness (Pain to palpation on the left anterior shoulder.) present.   Abdominal:      General: Abdomen is flat.      Palpations: Abdomen is soft.      Tenderness: There " is no abdominal tenderness.   Musculoskeletal:      Cervical back: Normal range of motion.      Right lower leg: No tenderness. No edema.      Left lower leg: No tenderness. No edema.   Skin:     General: Skin is warm.      Capillary Refill: Capillary refill takes less than 2 seconds.      Coloration: Skin is not pale.   Neurological:      General: No focal deficit present.      Mental Status: She is alert and oriented to person, place, and time.   Psychiatric:         Mood and Affect: Mood normal.           LAB RESULTS  Lab Results (last 24 hours)     ** No results found for the last 24 hours. **            I ordered the above labs and reviewed the results    RADIOLOGY  XR Chest 2 View    Result Date: 11/24/2021  CR Chest 2 Vws INDICATION:  Chest pain with left arm numbness COMPARISON:  6/22/2021 FINDINGS: PA and lateral views of the chest.  Heart and mediastinal contours are normal. The lungs are clear. No pneumothorax or pleural effusion.      No acute cardiopulmonary findings. Signer Name: Halima Vargas MD  Signed: 11/24/2021 7:24 PM  Workstation Name: [id number]  [facility]      I ordered the above radiologic testing and reviewed the results    PROCEDURES  Procedures      PROGRESS AND CONSULTS  ED Course as of 11/24/21 1956 Wed Nov 24, 2021 1945 CXR: no acute cardiopulmonary disease  [KM]      ED Course User Index  [KM] Sommer Vazquez, VALENTINE           MEDICAL DECISION MAKING    MDM     21-year-old female seen and evaluated for chest pain and arm numbness. In regards to her chest pain it is reproducible to palpation on exam, is not worse with exertion, no associated syncope or radiation to the back. This will be evaluated with a chest x-ray. I think it is very unlikely that patient is experiencing a pulmonary embolism as her PERC score is 0. She also is unlikely to be experiencing ACS that she is not a smoker and is very young with no other risk factors present.  In regards to her  arm numbness this seems to be very positional and is likely to be paresthesias. She has no recent neck trauma or injuries.    Chest x-ray is negative for any acute cardiopulmonary disease.    I discussed all this with the patient and she voiced understanding. She should use Tylenol and ibuprofen to help with her musculoskeletal pain. She was agreeable with this plan and was discharged home in stable condition.    DIAGNOSIS  Final diagnoses:   Musculoskeletal chest pain       Latest Documented Vital Signs:  As of 19:56 EST  BP- 146/99 HR- 81 Temp- 98.6 °F (37 °C) (Oral) O2 sat- 100%    DISPOSITION    Discussed pertinent findings with the patient/family.  Patient/Family voiced understanding of need to follow-up for recheck and further testing as needed.  Return to the Emergency Department warnings were given.         Medication List      No changes were made to your prescriptions during this visit.              Follow-up Information     Call  Nandini Phelps MD.    Specialty: Internal Medicine  Why: As needed  Contact information:  [address] [place] [id number] [phone]                           Dictated utilizing Dragon dictation     Sommer Vazquez PA-C  11/24/21 1956

## 2022-11-01 ENCOUNTER — OFFICE VISIT (OUTPATIENT)
Dept: OBSTETRICS AND GYNECOLOGY | Facility: CLINIC | Age: 22
End: 2022-11-01

## 2022-11-01 VITALS
WEIGHT: 134 LBS | BODY MASS INDEX: 26.31 KG/M2 | HEIGHT: 60 IN | DIASTOLIC BLOOD PRESSURE: 72 MMHG | SYSTOLIC BLOOD PRESSURE: 120 MMHG

## 2022-11-01 DIAGNOSIS — Z3A.09 9 WEEKS GESTATION OF PREGNANCY: ICD-10-CM

## 2022-11-01 DIAGNOSIS — N92.6 MISSED MENSES: Primary | ICD-10-CM

## 2022-11-01 LAB
B-HCG UR QL: POSITIVE
EXPIRATION DATE: ABNORMAL
INTERNAL NEGATIVE CONTROL: ABNORMAL
INTERNAL POSITIVE CONTROL: ABNORMAL
Lab: ABNORMAL

## 2022-11-01 PROCEDURE — 99203 OFFICE O/P NEW LOW 30 MIN: CPT | Performed by: OBSTETRICS & GYNECOLOGY

## 2022-11-01 PROCEDURE — 81025 URINE PREGNANCY TEST: CPT | Performed by: OBSTETRICS & GYNECOLOGY

## 2022-11-01 RX ORDER — PRENATAL VIT NO.126/IRON/FOLIC 28MG-0.8MG
1 TABLET ORAL DAILY
COMMUNITY

## 2022-11-01 NOTE — PROGRESS NOTES
"      Tanna Jones is a 22 y.o. patient who presents for follow up of   Chief Complaint   Patient presents with   • Amenorrhea       HPI 22-year-old G1 at 9 weeks by sure last menstrual period presents for confirmation of pregnancy.  She had a positive home pregnancy test back in late September.  She reports mild nausea but very little vomiting.  No vaginal bleeding and no abdominal pain.  This is her first pregnancy and she is a new patient to our practice.    Patient's past medical, surgical and social history reviewed and updated in epic.    The following portions of the patient's history were reviewed and updated as appropriate: allergies, current medications and problem list.    Review of Systems   Constitutional: Negative for appetite change, fever and unexpected weight change.   HENT: Negative for congestion and sore throat.    Respiratory: Negative for cough and shortness of breath.    Cardiovascular: Negative for chest pain and palpitations.   Gastrointestinal: Positive for nausea. Negative for abdominal distention, abdominal pain, constipation, diarrhea and vomiting.   Endocrine: Negative.    Genitourinary: Negative for dyspareunia, menstrual problem, pelvic pain and vaginal discharge.   Skin: Negative.    Neurological: Negative for dizziness and syncope.   Hematological: Negative.    Psychiatric/Behavioral: Negative for dysphoric mood and sleep disturbance. The patient is not nervous/anxious.        /72   Ht 152.4 cm (60\")   Wt 60.8 kg (134 lb)   LMP 08/30/2022   BMI 26.17 kg/m²     Physical Exam  Vitals and nursing note reviewed.   Constitutional:       Appearance: She is well-developed.   HENT:      Head: Normocephalic and atraumatic.   Pulmonary:      Effort: Pulmonary effort is normal. No respiratory distress.   Abdominal:      General: There is no distension.      Palpations: Abdomen is soft. There is no mass.      Tenderness: There is no abdominal tenderness. There is no guarding or " rebound.   Musculoskeletal:         General: Normal range of motion.   Skin:     General: Skin is warm and dry.   Neurological:      Mental Status: She is alert and oriented to person, place, and time.   Psychiatric:         Behavior: Behavior normal.         Thought Content: Thought content normal.         Judgment: Judgment normal.     Ultrasound to confirm viability and establish EDC was ordered.  Live viable muniz fetus with cardiac activity was seen.  Yolk sac was seen.  Measures 8 weeks and 4 days consistent with her LMP.  Small corpus luteum cyst was seen.  No free fluid in adnexa or cul-de-sac.      Assessment/Plan    Diagnoses and all orders for this visit:    1. Missed menses (Primary)  -     POC Pregnancy, Urine    2. 9 weeks gestation of pregnancy    Pregnancy was confirmed as well as viability.  EDC set for 6/6/2023.  Practice explained.  Plan new OB physical with labs at next visit.  Questions answered.I spent 30 minutes caring for Tanna on this date of service. This time includes time spent by me in the following activities: preparing for the visit, reviewing tests, performing a medically appropriate examination and/or evaluation, counseling and educating the patient/family/caregiver and independently interpreting results and communicating that information with the patient/family/caregiver      Return for US, TV (confirm dates/viability), NOB.      Constantino Hollis MD  11/1/2022  15:40 EDT

## 2022-11-14 ENCOUNTER — INITIAL PRENATAL (OUTPATIENT)
Dept: OBSTETRICS AND GYNECOLOGY | Facility: CLINIC | Age: 22
End: 2022-11-14

## 2022-11-14 VITALS — DIASTOLIC BLOOD PRESSURE: 72 MMHG | WEIGHT: 133 LBS | SYSTOLIC BLOOD PRESSURE: 122 MMHG | BODY MASS INDEX: 25.97 KG/M2

## 2022-11-14 DIAGNOSIS — Z36.9 ENCOUNTER FOR ANTENATAL SCREENING, UNSPECIFIED: ICD-10-CM

## 2022-11-14 DIAGNOSIS — Z34.91 INITIAL OBSTETRIC VISIT IN FIRST TRIMESTER: Primary | ICD-10-CM

## 2022-11-14 DIAGNOSIS — Z87.891 FORMER SMOKER: ICD-10-CM

## 2022-11-14 DIAGNOSIS — O21.9 NAUSEA AND VOMITING DURING PREGNANCY PRIOR TO 22 WEEKS GESTATION: ICD-10-CM

## 2022-11-14 LAB
EXTERNAL CYSTIC FIBROSIS: NORMAL
EXTERNAL NIPT: NORMAL
GLUCOSE UR STRIP-MCNC: NEGATIVE MG/DL
PROT UR STRIP-MCNC: NEGATIVE MG/DL

## 2022-11-14 PROCEDURE — 99214 OFFICE O/P EST MOD 30 MIN: CPT | Performed by: NURSE PRACTITIONER

## 2022-11-14 NOTE — PROGRESS NOTES
"Initial ob visit      Chief Complaint   Patient presents with   • Initial Prenatal Visit       Tanna Jones is being seen today for her first obstetrical visit.  She is a 22 y.o.  @ 10w6d gestation. This problem is new to me: yes       # 1 - Date: None, Sex: None, Weight: None, GA: None, Delivery: None, Apgar1: None, Apgar5: None, Living: None, Birth Comments: None      LNMP: 22  Confident with date: Yes  Taking prenatal vitamins: Yes  Planned pregnancy: Yes  Prior obstetric issues, potential pregnancy concerns: first pregnancy   Family history of genetic issues (includes FOB): denies   Varicella Hx: uncertain   Flu vaccine: declines  COVID Vaccine: has not had   History of STDs: denies   Current medications: PNV   Last pap smear: Has not had   Smoker: Yes, former smoker  Drug or alcohol abuse: No  H/O Physical, mental or sexual abuse: denies  H/O mental health disorder: h/o depression and anxiety   Prior testing for Cystic Fibrosis Carrier or Sickle Cell Trait- has not had   Prepregnancy BMI: Body mass index is 25.97 kg/m².      Past Medical History:   Diagnosis Date   • Anxiety    • Cervical dystonia     dx about 1 year ago ()   • Concussion     3/2018, hit left side of head on door   • Hypertension    • Torticollis 10/2016   • Tourette's        Past Surgical History:   Procedure Laterality Date   • HERNIA REPAIR           Current Outpatient Medications:   •  prenatal vitamin (prenatal, CLASSIC, vitamin) tablet, Take 1 tablet by mouth Daily., Disp: , Rfl:     No Known Allergies    Social History     Socioeconomic History   • Marital status:    Tobacco Use   • Smoking status: Former     Types: Electronic Cigarette   • Smokeless tobacco: Former   • Tobacco comments:     \"I stopped vaping yesterday\". States stoppede in Dec   Vaping Use   • Vaping Use: Former   • Substances: Nicotine, Flavoring   • Devices: Disposable   Substance and Sexual Activity   • Alcohol use: No   • Drug use: No   • " Sexual activity: Defer       Family History   Problem Relation Age of Onset   • No Known Problems Mother    • No Known Problems Father        Review of systems     All other systems reviewed and are negative except for: Gastrointestinal: positive for nausea     Objective    /72   Wt 60.3 kg (133 lb)   LMP 08/30/2022   BMI 25.97 kg/m²       General Appearance:    Alert, cooperative, in no acute distress, habitus normal    Head:    Not examined   Eyes:           Not examined   Ears:  Not examined       Neck:  No thyroid enlargement or nodules present   Back:     No kyphosis present, no scoliosis present,                       Lungs:     Clear to auscultation,respirations regular, even and                   unlabored    Heart:    Regular rhythm and normal rate, normal S1 and S2, no            murmur, no gallop, no rub, no click   Breast Exam:    No masses, No nipple discharge   Abdomen:     Normal bowel sounds, no masses, no organomegaly, soft        non-tender, non-distended, no guarding, no rebound                 tenderness   Genitalia:    Vulva - No masses, no atrophy, no lesions    Vagina - No discharge, No bleeding    Cervix - No Lesions, closed. Pap collected:Yes     Uterus - Consistent with 10 weeks.     Adnexa - No masses, non tender       Extremities:   Moves all extremities well, no edema, no cyanosis, no              redness       Skin:   No bleeding, bruising or rash       Neurologic:   Sensation intact, A&O times 3      Assessment/Plan    1) Pregnancy at 10w6d- EDC established 6/6/23 and confirmed by US and LNMP.  +FHTs today.     2) OB exam: OB exam completed: Yes. New OB bag provided Yes. Pap collected: Yes.    3) Labs: OB labs collected: Yes Counseled on genetic screening: Yes, she declines CF, SMA and FX. Counseled on Quad screen and AFP: No, she is too early for AFP. Counseled on NIPS: Yes, she desires NIPS.      4) Body mass index is 25.97 kg/m². For women with a normal weight, with a BMI  between 18.5-24.5 before pregnancy, the recommended weight gain is 25-35 pounds for the entire pregnancy     5)  Prenatal care: Oriented to the office and prenatal care. Encourage prenatal vitamins. Disc Tylenol products are fine, avoid aspirin and ibuprofen; Zika (travel restrictions/ok to use insect repellant); not to change cat litter; food restrictions; exercise;  avoidance of alcohol, tobacco, drugs and saunas/hot tubs.     6) COVID19 precautions were reviewed with the patient. Continue to encourage social distancing, wearing a mask, and good hand hygiene.  I wore a mask, protective eye wear, and gloves during this patient encounter.  Patient also wearing a surgical mask and social distancing was observed. Hand hygeine performed before and after seeing the patient. Info provided on Covid vaccine: has not had     7) Flu vaccine- Encouraged the flu vaccine during pregnancy. Discuss normal physiological changes during pregnancy increase the susceptibility of the flu virus and increase the risk of severe illness for the pregnant woman. Disc flu can be harmful to the unborn baby as well. Enc the flu vaccine. Disc with patient that getting the flu vaccine is the first and most important step in protecting against the flu. Flu vaccines given during pregnancy help protect both the mother and the baby. Getting the flu vaccine during pregnancy also helps protect the  from flu illness for several months after their birth, when then are too young to get vaccinated. Also disc the importance of good hand hygiene and avoiding people who are sick. The patient declines the flu vaccine.      8) H/O depression and anxiety- No current meds. No counselor.     9) Nausea- Enc small frequent meals. Disc use of B6 and Unisom.     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     All questions answered.     RTO 4 weeks     Xenia Greer, ANEESH  2022  14:23 EST    I spent 30 minutes  caring for Tanna on this date of service. This time includes time spent by me in the following activities: preparing for the visit, reviewing tests, obtaining and/or reviewing a separately obtained history, performing a medically appropriate examination and/or evaluation, counseling and educating the patient/family/caregiver, ordering medications, tests, or procedures and documenting information in the medical record

## 2022-11-15 LAB
ABO GROUP BLD: ABNORMAL
BASOPHILS # BLD AUTO: 0 X10E3/UL (ref 0–0.2)
BASOPHILS NFR BLD AUTO: 0 %
BLD GP AB SCN SERPL QL: NEGATIVE
EOSINOPHIL # BLD AUTO: 0.1 X10E3/UL (ref 0–0.4)
EOSINOPHIL NFR BLD AUTO: 1 %
ERYTHROCYTE [DISTWIDTH] IN BLOOD BY AUTOMATED COUNT: 13 % (ref 11.7–15.4)
HBA1C MFR BLD: 5 % (ref 4.8–5.6)
HBV SURFACE AG SERPL QL IA: NEGATIVE
HCT VFR BLD AUTO: 33.4 % (ref 34–46.6)
HCV AB S/CO SERPL IA: <0.1 S/CO RATIO (ref 0–0.9)
HGB BLD-MCNC: 11.3 G/DL (ref 11.1–15.9)
HIV 1+2 AB+HIV1 P24 AG SERPL QL IA: NON REACTIVE
IMM GRANULOCYTES # BLD AUTO: 0 X10E3/UL (ref 0–0.1)
IMM GRANULOCYTES NFR BLD AUTO: 0 %
LYMPHOCYTES # BLD AUTO: 2.1 X10E3/UL (ref 0.7–3.1)
LYMPHOCYTES NFR BLD AUTO: 26 %
MCH RBC QN AUTO: 29.6 PG (ref 26.6–33)
MCHC RBC AUTO-ENTMCNC: 33.8 G/DL (ref 31.5–35.7)
MCV RBC AUTO: 87 FL (ref 79–97)
MONOCYTES # BLD AUTO: 0.6 X10E3/UL (ref 0.1–0.9)
MONOCYTES NFR BLD AUTO: 8 %
NEUTROPHILS # BLD AUTO: 5.3 X10E3/UL (ref 1.4–7)
NEUTROPHILS NFR BLD AUTO: 65 %
PLATELET # BLD AUTO: 200 X10E3/UL (ref 150–450)
RBC # BLD AUTO: 3.82 X10E6/UL (ref 3.77–5.28)
RH BLD: POSITIVE
RPR SER QL: NON REACTIVE
RUBV IGG SERPL IA-ACNC: <0.9 INDEX
VZV IGG SER IA-ACNC: 513 INDEX
WBC # BLD AUTO: 8.1 X10E3/UL (ref 3.4–10.8)

## 2022-11-16 LAB
AMPHETAMINES UR QL SCN: NEGATIVE NG/ML
BACTERIA UR CULT: NORMAL
BACTERIA UR CULT: NORMAL
BARBITURATES UR QL SCN: NEGATIVE NG/ML
BENZODIAZ UR QL SCN: NEGATIVE NG/ML
BZE UR QL SCN: NEGATIVE NG/ML
CANNABINOIDS UR QL SCN: NEGATIVE NG/ML
CREAT UR-MCNC: 50.1 MG/DL (ref 20–300)
LABORATORY COMMENT REPORT: NORMAL
METHADONE UR QL SCN: NEGATIVE NG/ML
OPIATES UR QL SCN: NEGATIVE NG/ML
OXYCODONE+OXYMORPHONE UR QL SCN: NEGATIVE NG/ML
PCP UR QL: NEGATIVE NG/ML
PH UR: 5.8 [PH] (ref 4.5–8.9)
PROPOXYPH UR QL SCN: NEGATIVE NG/ML

## 2022-11-18 LAB
CFDNA.FET/CFDNA.TOTAL SFR FETUS: NORMAL %
CITATION REF LAB TEST: NORMAL
FET 13+18+21+X+Y ANEUP PLAS.CFDNA: NEGATIVE
FET CHR 21 TS PLAS.CFDNA QL: NEGATIVE
FET SEX PLAS.CFDNA DOSAGE CFDNA: NORMAL
FET TS 13 RISK PLAS.CFDNA QL: NEGATIVE
FET TS 18 RISK WBC.DNA+CFDNA QL: NEGATIVE
GA EST FROM CONCEPTION DATE: NORMAL D
GESTATIONAL AGE > 9:: YES
LAB DIRECTOR NAME PROVIDER: NORMAL
LAB DIRECTOR NAME PROVIDER: NORMAL
LABORATORY COMMENT REPORT: NORMAL
LIMITATIONS OF THE TEST: NORMAL
NEGATIVE PREDICTIVE VALUE: NORMAL
NOTE: NORMAL
PERFORMANCE CHARACTERISTICS: NORMAL
POSITIVE PREDICTIVE VALUE: NORMAL
REF LAB TEST METHOD: NORMAL
TEST PERFORMANCE INFO SPEC: NORMAL

## 2022-11-20 LAB
C TRACH RRNA CVX QL NAA+PROBE: NEGATIVE
CONV .: NORMAL
CYTOLOGIST CVX/VAG CYTO: NORMAL
CYTOLOGY CVX/VAG DOC CYTO: NORMAL
CYTOLOGY CVX/VAG DOC THIN PREP: NORMAL
DX ICD CODE: NORMAL
HIV 1 & 2 AB SER-IMP: NORMAL
N GONORRHOEA RRNA CVX QL NAA+PROBE: NEGATIVE
OTHER STN SPEC: NORMAL
STAT OF ADQ CVX/VAG CYTO-IMP: NORMAL
T VAGINALIS RRNA SPEC QL NAA+PROBE: NEGATIVE

## 2022-11-21 LAB
CYSTIC FIBROSIS MUTATION 97: NORMAL
GENE DIS ANL CARRIER INTERP-IMP: NORMAL

## 2022-11-23 LAB
CLINICAL GENETICS COUNSELING NOTE: NORMAL
CLINICAL INFO: NORMAL
ETHNIC BACKGROUND STATED: NORMAL
FMR1 GENE CGG RPT BLD/T QL: NORMAL
LAB DIRECTOR NAME PROVIDER: NORMAL
LABORATORY COMMENT REPORT: NORMAL
LABORATORY COMMENT REPORT: NORMAL
REASON FOR REFERRAL (NARRATIVE): NORMAL
REF LAB TEST METHOD: NORMAL
SMN1 GENE MUT ANL BLD/T: NORMAL
SPECIMEN SOURCE: NORMAL
TEST PERFORMANCE INFO SPEC: NORMAL
TEST PERFORMANCE INFO SPEC: NORMAL

## 2022-12-13 ENCOUNTER — ROUTINE PRENATAL (OUTPATIENT)
Dept: OBSTETRICS AND GYNECOLOGY | Facility: CLINIC | Age: 22
End: 2022-12-13

## 2022-12-13 VITALS — BODY MASS INDEX: 26.76 KG/M2 | SYSTOLIC BLOOD PRESSURE: 122 MMHG | WEIGHT: 137 LBS | DIASTOLIC BLOOD PRESSURE: 70 MMHG

## 2022-12-13 DIAGNOSIS — Z34.92 NORMAL PREGNANCY IN SECOND TRIMESTER: Primary | ICD-10-CM

## 2022-12-13 LAB
GLUCOSE UR STRIP-MCNC: NEGATIVE MG/DL
PROT UR STRIP-MCNC: NEGATIVE MG/DL

## 2022-12-13 PROCEDURE — 99213 OFFICE O/P EST LOW 20 MIN: CPT | Performed by: OBSTETRICS & GYNECOLOGY

## 2022-12-13 NOTE — PROGRESS NOTES
OB follow up     Tanna Jones is a 22 y.o.  15w0d being seen today for her obstetrical visit.  Patient reports no bleeding, no contractions and no leaking. Fetal movement: absent.  Nausea and vomiting resolved.  Feels well.    Review of Systems  No bleeding, No cramping/contractions     /70   Wt 62.1 kg (137 lb)   LMP 2022   BMI 26.76 kg/m²     FHT:   BPM   Uterine Size:  15 cm   Prenatal labs reviewed with the patient.  Too early for AFP.    Assessment/Plan:    1) 22 y.o.  -pregnancy at 15w0d    2)   Encounter Diagnosis   Name Primary?   • Normal pregnancy in second trimester Yes   Normal prenatal labs.  Doing much better now than second trimester with nausea vomiting.  Ultrasound anatomy next visit.I spent 20 minutes caring for Tanna on this date of service. This time includes time spent by me in the following activities: preparing for the visit, reviewing tests, obtaining and/or reviewing a separately obtained history, performing a medically appropriate examination and/or evaluation, counseling and educating the patient/family/caregiver, ordering medications, tests, or procedures and documenting information in the medical record.    3) Reviewed this stage of pregnancy  4) Problem list updated     Return in about 5 weeks (around 2023) for US, Anatomy, OB Jose R.      Constantino Hollis MD    2022  13:30 EST

## 2023-01-17 ENCOUNTER — ROUTINE PRENATAL (OUTPATIENT)
Dept: OBSTETRICS AND GYNECOLOGY | Facility: CLINIC | Age: 23
End: 2023-01-17
Payer: COMMERCIAL

## 2023-01-17 VITALS — BODY MASS INDEX: 28.2 KG/M2 | DIASTOLIC BLOOD PRESSURE: 68 MMHG | WEIGHT: 144.4 LBS | SYSTOLIC BLOOD PRESSURE: 112 MMHG

## 2023-01-17 DIAGNOSIS — Z3A.20 20 WEEKS GESTATION OF PREGNANCY: Primary | ICD-10-CM

## 2023-01-17 PROBLEM — Z34.90 PREGNANCY: Status: ACTIVE | Noted: 2023-01-17

## 2023-01-17 LAB
GLUCOSE UR STRIP-MCNC: NEGATIVE MG/DL
PROT UR STRIP-MCNC: NEGATIVE MG/DL

## 2023-01-17 PROCEDURE — 99213 OFFICE O/P EST LOW 20 MIN: CPT | Performed by: OBSTETRICS & GYNECOLOGY

## 2023-01-17 NOTE — PROGRESS NOTES
CC: Pt here for ob office visit and anatomy u/s.    HPI: Tanna Jones is a 22 y.o.  20w0d being seen today for her obstetrical visit.   Patient reports L rib cage pain, occasionally .   Fetal movement: too early. .        ROS: Pt denies visual changes, headaches, shortness of breath, chest pain, esophageal reflux, gastric pain,   nausea and vomiting, diarrhea, rashes, vaginal bleeding, edema, hip pain, pelvic pressure.     SMOKER? No    ALCOHOL? No  ILLICIT DRUGS? No    O:  /68   Wt 65.5 kg (144 lb 6.4 oz)   LMP 2022   BMI 28.20 kg/m² , additional findings in addition to above flow sheet?: u/s    Data Review:  UA, flow sheet,  TESTING: u/s: anatomy scan incomplete.  Will rpt 4 weeks    Lab Results (last 24 hours)     Procedure Component Value Units Date/Time    POC Urinalysis Dipstick [[id number]] Resulted: 23    Specimen: Urine Updated: 23 1140     Glucose, UA Negative     Protein, POC Negative          I saw the patient with a face mask, gloves and eye protection  The patient herself was masked.  Social distancing was observed as appropriate. All COVID precautions observed.     A:    DIAGNOSES:  22 y.o.  20w0d  Patient Active Problem List   Diagnosis   • Former smoker   • Pregnancy     Diagnoses and all orders for this visit:    1. 20 weeks gestation of pregnancy (Primary)      Pregnancy Assessment : Active Problem with Pregnancy L rib cage pain, no fetal movement.  NEW PROBLEMS? yes    P:  Tests ordered for this or next visit: LABS: ua and ULTRASOUND FOR anatomy  New Meds:No  Return in about 4 weeks (around 2023) for ob tummy.      Pt instructed to call for results of any testing done today and that failure to call if she has not heard from us could result in inadequate care and treament.  Pt verbalized her understanding.     I explained to the pt that the reason she wasn't feeling any movement was because she has an anterior placenta and that that would often  shield her from detecting any movement.     I spent 20+ minutes caring for Tanna on this date of service. This time includes time spent by me in the following activities: preparing for the visit, reviewing tests, obtaining and/or reviewing a separately obtained history, performing a medically appropriate examination and/or evaluation, counseling and educating the patient/family/caregiver, ordering medications, tests, or procedures, referring and communicating with other health care professionals, documenting information in the medical record, independently interpreting results and communicating that information with the patient/family/caregiver and care coordination    Roberto Alcala MD

## 2023-02-13 ENCOUNTER — REFERRAL TRIAGE (OUTPATIENT)
Dept: LABOR AND DELIVERY | Facility: HOSPITAL | Age: 23
End: 2023-02-13
Payer: COMMERCIAL

## 2023-02-15 ENCOUNTER — PATIENT OUTREACH (OUTPATIENT)
Dept: LABOR AND DELIVERY | Facility: HOSPITAL | Age: 23
End: 2023-02-15
Payer: COMMERCIAL

## 2023-02-15 NOTE — OUTREACH NOTE
[facility]  Enrollment    Current Estimated Gestational Age: 24w1d    Questions/Answers    Flowsheet Row Responses   Would like to participate? Yes   Date of Intake Visit 02/16/23          SDOH sent today. Intake 2.16.23.    Yared Arndt RN  Maternity Nurse Navigator    2/15/2023, 14:20 EST

## 2023-02-16 ENCOUNTER — PATIENT OUTREACH (OUTPATIENT)
Dept: LABOR AND DELIVERY | Facility: HOSPITAL | Age: 23
End: 2023-02-16
Payer: COMMERCIAL

## 2023-02-16 ENCOUNTER — ROUTINE PRENATAL (OUTPATIENT)
Dept: OBSTETRICS AND GYNECOLOGY | Facility: CLINIC | Age: 23
End: 2023-02-16
Payer: COMMERCIAL

## 2023-02-16 VITALS — BODY MASS INDEX: 31.05 KG/M2 | DIASTOLIC BLOOD PRESSURE: 74 MMHG | WEIGHT: 159 LBS | SYSTOLIC BLOOD PRESSURE: 124 MMHG

## 2023-02-16 DIAGNOSIS — Z3A.24 24 WEEKS GESTATION OF PREGNANCY: Primary | ICD-10-CM

## 2023-02-16 PROBLEM — O09.899 RUBELLA NON-IMMUNE STATUS, ANTEPARTUM: Status: ACTIVE | Noted: 2023-02-16

## 2023-02-16 PROBLEM — Z28.39 RUBELLA NON-IMMUNE STATUS, ANTEPARTUM: Status: ACTIVE | Noted: 2023-02-16

## 2023-02-16 LAB
GLUCOSE UR STRIP-MCNC: NEGATIVE MG/DL
PROT UR STRIP-MCNC: NEGATIVE MG/DL

## 2023-02-16 PROCEDURE — 99213 OFFICE O/P EST LOW 20 MIN: CPT | Performed by: OBSTETRICS & GYNECOLOGY

## 2023-02-16 NOTE — PROGRESS NOTES
OB follow up     Chief Complaint: PNC FU    Tanna Jones is a 22 y.o.  24w2d being seen today for her obstetrical visit.  Patient reports no complaints. Fetal movement: normal.    Review of Systems  No bleeding, No cramping/contractions     /74   Wt 72.1 kg (159 lb)   LMP 2022   BMI 31.05 kg/m²     FHT:  present   Uterine Size:   28cm         Assessment/Plan: Low risk pregnancy    1) pregnancy at 24w2d: fu for 2hr GTT     2) RNI: vaccinate PP.    3) declines flu vaccine    4) Size greater than dates: Check US at fu visit.    Reviewed this stage of pregnancy  Problem list updated   No follow-ups on file.      Maryam Bautista DO    2023  12:08 EST

## 2023-02-16 NOTE — OUTREACH NOTE
[facility]  Unable to Reach       Questions/Answers    Flowsheet Row Responses   Pending Outreach Intake Visit   Call Attempt First   Outcome Not available   Next Call Attempt Date 02/20/23          UTR for scheduled intake visit. Will try again 2.20.23, Monday.    Yared Arndt RN  Maternity Nurse Navigator    2/16/2023, 13:09 EST

## 2023-02-20 ENCOUNTER — PATIENT OUTREACH (OUTPATIENT)
Dept: LABOR AND DELIVERY | Facility: HOSPITAL | Age: 23
End: 2023-02-20
Payer: COMMERCIAL

## 2023-02-20 NOTE — OUTREACH NOTE
[facility]  Intake    Current Estimated Gestational Age: 24w6d    Intake Assessment    Flowsheet Row Responses   Best Method for Contacting Cell   Currently Employed Yes   Able to keep appointments as scheduled Yes   Gender(s) and Name(s) Ángel griffin   Do you have a dentist? No  [sent website]   Resources Presently Utilizing: None   Maternal Warning Signs Provided   Other Education HANDS, How to find a dentist, How to find a pediatrician, Insurance benefits/Incentives, SNAP Benefits          Learning Assessment    Flowsheet Row Responses   Relationship Patient   Learner Name Tanna   Does the learner have any barriers to learning? No Barriers   What is the preferred language of the learner for medical teaching? English   Is an  required? No   How does the learner prefer to learn new concepts? Listening, Reading, Demonstration        Tobacco, Alcohol, and Drug History     reports that she has quit smoking. Her smoking use included electronic cigarette. She has quit using smokeless tobacco.   reports no history of alcohol use.   reports no history of drug use.    [facility]  Check-In    Current Estimated Gestational Age: 24w6d    Questions/Answers    Flowsheet Row Responses   Best Method for Contacting Cell   Demographics Reviewed Yes   Currently Employed Yes   Able to keep appointments as scheduled Yes   Gender(s) and Name(s) Ángel griffin   Baby Active/Feeling Fetal Movemen Yes   How are you presently feeling? God   Questions regarding prenatal visits or tests to be ordered? No   Education related to new diagnoses/home equipment No   May I ask you questions about your substance use? --  [no hx]   Supplies ready for baby Car Seat, Clothing, Crib, Diapers, Feeding Supplies   Resource/Environmental Concerns None   Do you have any questions related to your care experience, your pregnancy, plans for delivery, any concerns, etc? No   Other Education HANDS, How to find a dentist, How to  find a pediatrician, Insurance benefits/Incentives, SNAP Benefits        Intake packet sent. Encouraged her to check out [employer] site for providers. Contact info given and encouraged her to reach out with any needs. No c/o today, feeling well, and baby moving well.    F/U at 28wks, 3.14.23    Yared Arndt RN  Maternity Nurse Navigator    2/20/2023, 13:13 EST

## 2023-03-13 ENCOUNTER — PATIENT OUTREACH (OUTPATIENT)
Dept: LABOR AND DELIVERY | Facility: HOSPITAL | Age: 23
End: 2023-03-13
Payer: COMMERCIAL

## 2023-03-13 NOTE — OUTREACH NOTE
[facility]  Check-In    Current Estimated Gestational Age: 27w6d    Questions/Answers    Flowsheet Row Responses   Best Method for Contacting Cell   Demographics Reviewed Yes   Currently Employed Yes   Able to keep appointments as scheduled Yes   Gender(s) and Name(s) m   Baby Active/Feeling Fetal Movemen Yes   How are you presently feeling? Good   Questions regarding prenatal visits or tests to be ordered? No   Supplies ready for baby Car Seat, Clothing, Crib, Diapers, Feeding Supplies   Resource/Environmental Concerns None   Do you have any questions related to your care experience, your pregnancy, plans for delivery, any concerns, etc? No   Other Education [facility] Benefits          Pt doing well today with no c/o. Plans to call [place] soon to sign up.  Sending 28wk packet plus website for ped today. Encouraged her to reach out with any needs.  Has all supplies.    F/U 5.9.23 at 36wks.    Yared Arndt RN  Maternity Nurse Navigator    3/13/2023, 15:09 EDT

## 2023-03-15 ENCOUNTER — ROUTINE PRENATAL (OUTPATIENT)
Dept: OBSTETRICS AND GYNECOLOGY | Facility: CLINIC | Age: 23
End: 2023-03-15
Payer: COMMERCIAL

## 2023-03-15 VITALS — BODY MASS INDEX: 31.95 KG/M2 | WEIGHT: 163.6 LBS | SYSTOLIC BLOOD PRESSURE: 126 MMHG | DIASTOLIC BLOOD PRESSURE: 78 MMHG

## 2023-03-15 DIAGNOSIS — Z3A.28 28 WEEKS GESTATION OF PREGNANCY: ICD-10-CM

## 2023-03-15 DIAGNOSIS — Z28.39 RUBELLA NON-IMMUNE STATUS, ANTEPARTUM: ICD-10-CM

## 2023-03-15 DIAGNOSIS — Z36.9 ENCOUNTER FOR ANTENATAL SCREENING, UNSPECIFIED: Primary | ICD-10-CM

## 2023-03-15 DIAGNOSIS — O09.899 RUBELLA NON-IMMUNE STATUS, ANTEPARTUM: ICD-10-CM

## 2023-03-15 DIAGNOSIS — O40.9XX0 POLYHYDRAMNIOS, ANTEPARTUM, SINGLE OR UNSPECIFIED FETUS: ICD-10-CM

## 2023-03-15 LAB
GLUCOSE 1H P 75 G GLC PO SERPL-MCNC: 104 MG/DL (ref 65–179)
GLUCOSE 2H P 75 G GLC PO SERPL-MCNC: 101 MG/DL (ref 65–154)
GLUCOSE P FAST SERPL-MCNC: 70 MG/DL (ref 65–94)
GLUCOSE UR STRIP-MCNC: NEGATIVE MG/DL
HCT VFR BLD AUTO: 33 % (ref 34–46.6)
HGB BLD-MCNC: 11.4 G/DL (ref 12–15.9)
PROT UR STRIP-MCNC: NEGATIVE MG/DL

## 2023-03-15 PROCEDURE — 99214 OFFICE O/P EST MOD 30 MIN: CPT | Performed by: STUDENT IN AN ORGANIZED HEALTH CARE EDUCATION/TRAINING PROGRAM

## 2023-03-15 PROCEDURE — 90715 TDAP VACCINE 7 YRS/> IM: CPT | Performed by: STUDENT IN AN ORGANIZED HEALTH CARE EDUCATION/TRAINING PROGRAM

## 2023-03-15 PROCEDURE — 90471 IMMUNIZATION ADMIN: CPT | Performed by: STUDENT IN AN ORGANIZED HEALTH CARE EDUCATION/TRAINING PROGRAM

## 2023-03-15 NOTE — PROGRESS NOTES
OB follow up     Chief Complaint: C FU    Tanna Jones is a 22 y.o.  28+1 being seen today for her obstetrical visit.  Patient reports no complaints. Fetal movement: normal.    Review of Systems  No bleeding, No cramping/contractions     /78   Wt 74.2 kg (163 lb 9.6 oz)   LMP 2022   BMI 31.95 kg/m²     Vitals: VSS; AF    General Appearance:  Awake. Alert. Well developed. Well nourished. In no acute distress.    Visual Inspection: ° Abdomen was normal on visual inspection.  Palpation: ° Abdomen was soft. ° Abdominal non-tender.    Uterus: ° Fundal height was normal for gestational age. ° Not tender.  Uterine Adnexae: ° Normal without masses or tenderness.  Neurological:  ° Oriented to time, place, and person.  Skin:  ° General appearance was normal. No bruising or ecchymosis.  Obstetrical: +FM and FCA     Presentation: Breech  Placenta: Anterior  LATASHA: 30.3cm ( Poly)   FCA: 130's    EFW  1190g 2.10# 52.5%       Assessment/Plan: Low risk pregnancy    1) pregnancy at 28+   2hr gtt and H.H pending     2) RNI: vaccinate PP.    3) declines flu vaccine    4) Size greater than dates: Growth today     5) Tdap received     6) Polyhydramnios     Growth q 4 weeks ( Today 52.5%)   NST/BPP @ 32wga   IOL 39wga     Reviewed this stage of pregnancy  Problem list updated     F/u 2 weeks for OB     I spent 45 minutes caring for Tanna on this date of service. This time includes time spent by me in the following activities: preparing for the visit, reviewing tests, obtaining and/or reviewing a separately obtained history, performing a medically appropriate examination and/or evaluation, counseling and educating the patient/family/caregiver, ordering medications, tests, or procedures, documenting information in the medical record, independently interpreting results and communicating that information with the patient/family/caregiver and care coordination     Miguel Mcneill DO    3/15/2023  09:49 EDT

## 2023-03-17 ENCOUNTER — HOSPITAL ENCOUNTER (OUTPATIENT)
Facility: HOSPITAL | Age: 23
Discharge: HOME | End: 2023-03-17
Attending: OBSTETRICS & GYNECOLOGY | Admitting: OBSTETRICS & GYNECOLOGY
Payer: COMMERCIAL

## 2023-03-17 VITALS
TEMPERATURE: 98.1 F | HEART RATE: 90 BPM | SYSTOLIC BLOOD PRESSURE: 140 MMHG | DIASTOLIC BLOOD PRESSURE: 82 MMHG | RESPIRATION RATE: 16 BRPM

## 2023-03-17 LAB
AMPHET+METHAMPHET UR QL: NEGATIVE
AMPHETAMINES UR QL: NEGATIVE
BACTERIA UR QL AUTO: ABNORMAL /HPF
BARBITURATES UR QL SCN: NEGATIVE
BENZODIAZ UR QL SCN: NEGATIVE
BILIRUB UR QL STRIP: NEGATIVE
BUPRENORPHINE SERPL-MCNC: NEGATIVE NG/ML
CANNABINOIDS SERPL QL: NEGATIVE
CLARITY UR: CLEAR
COCAINE UR QL: NEGATIVE
COLOR UR: YELLOW
GLUCOSE UR STRIP-MCNC: NEGATIVE MG/DL
HGB UR QL STRIP.AUTO: ABNORMAL
HYALINE CASTS UR QL AUTO: ABNORMAL /LPF
KETONES UR QL STRIP: NEGATIVE
LEUKOCYTE ESTERASE UR QL STRIP.AUTO: NEGATIVE
METHADONE UR QL SCN: NEGATIVE
NITRITE UR QL STRIP: NEGATIVE
OPIATES UR QL: NEGATIVE
OXYCODONE UR QL SCN: NEGATIVE
PCP UR QL SCN: NEGATIVE
PH UR STRIP.AUTO: 7 [PH] (ref 4.5–8)
PROPOXYPH UR QL: NEGATIVE
PROT UR QL STRIP: ABNORMAL
RBC # UR STRIP: ABNORMAL /HPF
REF LAB TEST METHOD: ABNORMAL
SP GR UR STRIP: 1.01 (ref 1–1.03)
SQUAMOUS #/AREA URNS HPF: ABNORMAL /HPF
TRICYCLICS UR QL SCN: NEGATIVE
UROBILINOGEN UR QL STRIP: ABNORMAL
WBC # UR STRIP: ABNORMAL /HPF

## 2023-03-17 PROCEDURE — 96361 HYDRATE IV INFUSION ADD-ON: CPT

## 2023-03-17 PROCEDURE — 81001 URINALYSIS AUTO W/SCOPE: CPT | Performed by: OBSTETRICS & GYNECOLOGY

## 2023-03-17 PROCEDURE — G0463 HOSPITAL OUTPT CLINIC VISIT: HCPCS

## 2023-03-17 PROCEDURE — 80306 DRUG TEST PRSMV INSTRMNT: CPT | Performed by: OBSTETRICS & GYNECOLOGY

## 2023-03-17 PROCEDURE — 59025 FETAL NON-STRESS TEST: CPT

## 2023-03-17 PROCEDURE — 96372 THER/PROPH/DIAG INJ SC/IM: CPT

## 2023-03-17 PROCEDURE — 96360 HYDRATION IV INFUSION INIT: CPT

## 2023-03-17 PROCEDURE — 25010000002 TERBUTALINE PER 1 MG: Performed by: OBSTETRICS & GYNECOLOGY

## 2023-03-17 RX ORDER — SODIUM CHLORIDE 0.9 % (FLUSH) 0.9 %
3 SYRINGE (ML) INJECTION EVERY 12 HOURS SCHEDULED
Status: DISCONTINUED | OUTPATIENT
Start: 2023-03-17 | End: 2023-03-18 | Stop reason: HOSPADM

## 2023-03-17 RX ORDER — SODIUM CHLORIDE, SODIUM LACTATE, POTASSIUM CHLORIDE, CALCIUM CHLORIDE 600; 310; 30; 20 MG/100ML; MG/100ML; MG/100ML; MG/100ML
150 INJECTION, SOLUTION INTRAVENOUS CONTINUOUS
Status: DISCONTINUED | OUTPATIENT
Start: 2023-03-17 | End: 2023-03-18 | Stop reason: HOSPADM

## 2023-03-17 RX ORDER — TERBUTALINE SULFATE 1 MG/ML
0.25 INJECTION, SOLUTION SUBCUTANEOUS ONCE
Status: COMPLETED | OUTPATIENT
Start: 2023-03-17 | End: 2023-03-17

## 2023-03-17 RX ORDER — SODIUM CHLORIDE 0.9 % (FLUSH) 0.9 %
10 SYRINGE (ML) INJECTION AS NEEDED
Status: DISCONTINUED | OUTPATIENT
Start: 2023-03-17 | End: 2023-03-18 | Stop reason: HOSPADM

## 2023-03-17 RX ORDER — LIDOCAINE HYDROCHLORIDE 10 MG/ML
5 INJECTION, SOLUTION EPIDURAL; INFILTRATION; INTRACAUDAL; PERINEURAL AS NEEDED
Status: DISCONTINUED | OUTPATIENT
Start: 2023-03-17 | End: 2023-03-18 | Stop reason: HOSPADM

## 2023-03-17 RX ADMIN — TERBUTALINE SULFATE 0.25 MG: 1 INJECTION, SOLUTION SUBCUTANEOUS at 21:02

## 2023-03-17 RX ADMIN — SODIUM CHLORIDE, POTASSIUM CHLORIDE, SODIUM LACTATE AND CALCIUM CHLORIDE 150 ML/HR: 600; 310; 30; 20 INJECTION, SOLUTION INTRAVENOUS at 20:58

## 2023-03-18 NOTE — NON STRESS TEST
Tanna Jones, a  at 28w3d with an RUMA of 2023, by Last Menstrual Period, was seen at [facility] for a nonstress test.  Fluids, terb x1    Chief Complaint   Patient presents with   • Abdominal Cramping       Patient Active Problem List   Diagnosis   • Former smoker   • Pregnancy   • Rubella non-immune status, antepartum   • Polyhydramnios, antepartum       Start Time:   Stop Time 2240  Interpretation A  Nonstress Test Interpretation A: Reactive (appropriate for gestational age)

## 2023-03-18 NOTE — NURSING NOTE
Discharge instructions verbal and written given, understanding voiced. Instructed to call MD and return to hospital if cramping worsens, with any back pain, vaginal bleeding, leaking fluid, or not feeling baby move. Also instructed to call MD if any swelling in feet or legs, headaches, or blurred vision. Instructed to keep next appointment with [facility]. Understanding voiced.

## 2023-03-18 NOTE — NURSING NOTE
Dr. Bautista on phone, aware patient a  at 28 3/7 with complaints of constant lower abdominal cramping and pressure for the last 4 hours. Denies back pain, denies bleeding or leaking fluid. Aware some irritability on monitor and one contraction. Aware cervical exam and U/A results, aware patient history, aware at last visit ultrasound show polyhydramnious, breech, and size greater than dates. Orders to start IV, give LR at 150 ml /hr, and give Terbutaline 0.25mg subcutaneously once and watch patient.

## 2023-03-18 NOTE — NURSING NOTE
Dr. Bautista on phone, aware patient states she is feeling a lot better. States her pain is maybe a 2 now, was rating it a 7 initially. Patient states she feels comfortable to go home, only lives 5 minutes away. Dr. Bautista aware patients BPs. Orders to DC home, keep next appointment. States do not have to do cervical exam, since patient is feeling better.

## 2023-03-20 NOTE — CASE MANAGEMENT/SOCIAL WORK
Case Management Discharge Note      Final Note: dc home         Selected Continued Care - Discharged on 3/17/2023 Admission date: 3/17/2023 - Discharge disposition: Home or Self Care    Destination    No services have been selected for the patient.              Durable Medical Equipment    No services have been selected for the patient.              Dialysis/Infusion    No services have been selected for the patient.              Home Medical Care    No services have been selected for the patient.              Therapy    No services have been selected for the patient.              Community Resources    No services have been selected for the patient.              Community & DME    No services have been selected for the patient.                Selected Continued Care - Episodes Includes continued care and service providers with selected services from the active episodes listed below    [facility] Episode start date: 2/13/2023   There are no active outsourced providers for this episode.                    Final Discharge Disposition Code: 01 - home or self-care

## 2023-03-27 ENCOUNTER — ROUTINE PRENATAL (OUTPATIENT)
Dept: OBSTETRICS AND GYNECOLOGY | Facility: CLINIC | Age: 23
End: 2023-03-27
Payer: COMMERCIAL

## 2023-03-27 VITALS — BODY MASS INDEX: 32.61 KG/M2 | SYSTOLIC BLOOD PRESSURE: 122 MMHG | DIASTOLIC BLOOD PRESSURE: 74 MMHG | WEIGHT: 167 LBS

## 2023-03-27 DIAGNOSIS — Z34.93 PRENATAL CARE IN THIRD TRIMESTER: Primary | ICD-10-CM

## 2023-03-27 LAB
GLUCOSE UR STRIP-MCNC: NEGATIVE MG/DL
PROT UR STRIP-MCNC: NEGATIVE MG/DL

## 2023-03-27 PROCEDURE — 99213 OFFICE O/P EST LOW 20 MIN: CPT | Performed by: NURSE PRACTITIONER

## 2023-03-27 RX ORDER — DOXYCYCLINE HYCLATE 50 MG/1
324 CAPSULE, GELATIN COATED ORAL
Qty: 30 TABLET | Refills: 2 | Status: SHIPPED | OUTPATIENT
Start: 2023-03-27

## 2023-03-27 NOTE — PROGRESS NOTES
OB follow up     Chief Complaint: PNC FU    Tanna Jones is a 22 y.o.  29w6d being seen today for her obstetrical visit.  Patient reports no complaints. Fetal movement: normal. Taking PNV.     Review of Systems  No bleeding, No cramping/contractions     /74   Wt 75.8 kg (167 lb)   LMP 2022   BMI 32.61 kg/m²     FHT:   140s    Uterine Size:   37cm          Assessment/Plan: Low risk pregnancy    1) pregnancy at 29w6d: Passed 2hr GTT. Rh +.     2) RNI: vaccinate PP.    3) declines flu vaccine    4) Anemia-  Is not taking iron. Hgb 11.4g/dL. ERX iron.     5) Growth 52% @ 28 weeks     6) Polyhydramnios- LATASHA 30cm. Check LATASHA today. Disc kick counts.     7) S>D- Growth 52% @ 28 weeks     8) S/P tDap vaccine    Reviewed this stage of pregnancy  Problem list updated   RTO 2 weeks     Xenia Greer, ANEESH  3/27/2023  11:23 EDT

## 2023-04-11 ENCOUNTER — ROUTINE PRENATAL (OUTPATIENT)
Dept: OBSTETRICS AND GYNECOLOGY | Facility: CLINIC | Age: 23
End: 2023-04-11
Payer: COMMERCIAL

## 2023-04-11 VITALS — WEIGHT: 171 LBS | DIASTOLIC BLOOD PRESSURE: 78 MMHG | SYSTOLIC BLOOD PRESSURE: 122 MMHG | BODY MASS INDEX: 33.4 KG/M2

## 2023-04-11 DIAGNOSIS — Z34.93 PRENATAL CARE IN THIRD TRIMESTER: Primary | ICD-10-CM

## 2023-04-11 LAB
GLUCOSE UR STRIP-MCNC: NEGATIVE MG/DL
PROT UR STRIP-MCNC: NEGATIVE MG/DL

## 2023-04-11 NOTE — PROGRESS NOTES
OB follow up     Tanna Jones is a 22 y.o.  32w0d being seen today for her obstetrical visit.  Patient reports no bleeding, no contractions and no leaking. Fetal movement: normal.  Here for follow-up ultrasound had polyhydramnios last ultrasound.  LATASHA was 30 cm.  She passed her 2-hour GTT last visit.    Review of Systems  No bleeding, No cramping/contractions     /78   Wt 77.6 kg (171 lb)   LMP 2022   BMI 33.40 kg/m²     FHT: present BPM   Uterine Size:     Ultrasound shows live viable muniz male fetus in the breech position.  Growth is in the 50th percentile.  LATASHA was normal at 20 cm.  Placenta was grade 1 and appeared normal.    Assessment/Plan:    1) 22 y.o.  -pregnancy at 32w0d    2)   Encounter Diagnosis   Name Primary?   • Prenatal care in third trimester Yes   Normal growth.  Resolution of the polyhydramnios.  LATASHA normal today.  breech position.  Routine follow-up.    3) Reviewed this stage of pregnancy  4) Problem list updated     Return in about 2 weeks (around 2023) for OB Jose R.    I spent 20 minutes caring for Tanna on this date of service. This time includes time spent by me in the following activities: preparing for the visit, reviewing tests, obtaining and/or reviewing a separately obtained history, performing a medically appropriate examination and/or evaluation, counseling and educating the patient/family/caregiver, documenting information in the medical record and independently interpreting results and communicating that information with the patient/family/caregiver.      Constantino Hollis MD    2023  14:49 EDT

## 2023-04-26 ENCOUNTER — ROUTINE PRENATAL (OUTPATIENT)
Dept: OBSTETRICS AND GYNECOLOGY | Facility: CLINIC | Age: 23
End: 2023-04-26
Payer: COMMERCIAL

## 2023-04-26 VITALS — WEIGHT: 176 LBS | DIASTOLIC BLOOD PRESSURE: 74 MMHG | BODY MASS INDEX: 34.37 KG/M2 | SYSTOLIC BLOOD PRESSURE: 120 MMHG

## 2023-04-26 DIAGNOSIS — O40.9XX0 POLYHYDRAMNIOS, ANTEPARTUM, SINGLE OR UNSPECIFIED FETUS: ICD-10-CM

## 2023-04-26 DIAGNOSIS — Z28.39 RUBELLA NON-IMMUNE STATUS, ANTEPARTUM: ICD-10-CM

## 2023-04-26 DIAGNOSIS — Z34.93 PRENATAL CARE IN THIRD TRIMESTER: Primary | ICD-10-CM

## 2023-04-26 DIAGNOSIS — O09.899 RUBELLA NON-IMMUNE STATUS, ANTEPARTUM: ICD-10-CM

## 2023-04-26 DIAGNOSIS — Z87.891 FORMER SMOKER: ICD-10-CM

## 2023-04-26 DIAGNOSIS — Z3A.34 34 WEEKS GESTATION OF PREGNANCY: ICD-10-CM

## 2023-04-26 LAB
GLUCOSE UR STRIP-MCNC: NEGATIVE MG/DL
PROT UR STRIP-MCNC: NEGATIVE MG/DL

## 2023-04-26 NOTE — PROGRESS NOTES
S:  cc: Pt here for ob office visit.  hpi: Tanna Jones is a 22 y.o.  34w1d being seen today for her obstetrical visit.   Patient reports no complaints .   Fetal movement: normal. .      Social History     Social History Narrative   • Not on file      SMOKER? No      O:  /74   Wt 79.8 kg (176 lb)   LMP 2022   BMI 34.37 kg/m²     Prenatal Vitals  BP: 120/74  Weight: 79.8 kg (176 lb)  Urine Glucose/Protein  Urine Glucose Read-only: Negative  Urine Protein Read-only: Negative    Brief Urine Lab Results  (Last result in the past 365 days)      Color   Clarity   Blood   Leuk Est   Nitrite   Protein   CREAT   Urine HCG        23 0000           Negative                 A:    DIAGNOSES:  22 y.o.  34w1d  There are no diagnoses linked to this encounter.  NEW PROBLEMS? none    P:  No follow-ups on file.    Pt instructed to call for results of any testing done today and that failure to call if she has not heard from us could result in inadequate care and treament.  Pt verbalized her understanding.   Time Spent: I spent 20+ minutes caring for Tanna on this date of service. This time includes time spent by me in the following activities: preparing for the visit, reviewing tests, obtaining and/or reviewing a separately obtained history, performing a medically appropriate examination and/or evaluation, counseling and educating the patient/family/caregiver, ordering medications, tests, or procedures, referring and communicating with other health care professionals, documenting information in the medical record, independently interpreting results and communicating that information with the patient/family/caregiver and care coordination.      Roberto Alcala MD  11:59 EDT   23

## 2023-05-03 ENCOUNTER — ROUTINE PRENATAL (OUTPATIENT)
Dept: OBSTETRICS AND GYNECOLOGY | Facility: CLINIC | Age: 23
End: 2023-05-03
Payer: COMMERCIAL

## 2023-05-03 VITALS — SYSTOLIC BLOOD PRESSURE: 124 MMHG | WEIGHT: 182.2 LBS | BODY MASS INDEX: 35.58 KG/M2 | DIASTOLIC BLOOD PRESSURE: 82 MMHG

## 2023-05-03 DIAGNOSIS — O09.899 RUBELLA NON-IMMUNE STATUS, ANTEPARTUM: ICD-10-CM

## 2023-05-03 DIAGNOSIS — N89.8 VAGINAL DISCHARGE: ICD-10-CM

## 2023-05-03 DIAGNOSIS — Z34.93 PRENATAL CARE IN THIRD TRIMESTER: Primary | ICD-10-CM

## 2023-05-03 DIAGNOSIS — Z28.39 RUBELLA NON-IMMUNE STATUS, ANTEPARTUM: ICD-10-CM

## 2023-05-03 LAB
GLUCOSE UR STRIP-MCNC: NEGATIVE MG/DL
PROT UR STRIP-MCNC: NEGATIVE MG/DL

## 2023-05-03 NOTE — PROGRESS NOTES
OB follow up     Tanna Jones is a 22 y.o.  35w1d being seen today for her obstetrical visit.  Patient reports no bleeding, occasional contractions and leaking fluid.. Fetal movement: normal.  Patient reports an increase in vaginal discharge recently and she is concerned her water may be broken.  It is soaking through her underwear and sometimes her pants and she is asking to change frequently over the last 24 hours.  She has had some mild contractions but nothing she cannot talk or walk through.  She denies any vaginal bleeding.  Prenatal care is further complicated by rubella nonimmune and we plan an MMR postpartum.    Review of Systems  No bleeding, No cramping/contractions     /82   Wt 82.6 kg (182 lb 3.2 oz)   LMP 2022   BMI 35.58 kg/m²     FHT: present BPM   Uterine Size: 35 cm   Speculum exam was done.  No pooling.  pH normal.  Likely mucous plug.  No evidence of ruptured membranes.    Assessment/Plan:    1) 22 y.o.  -pregnancy at 35w1d    2)   Encounter Diagnoses   Name Primary?   • Prenatal care in third trimester Yes   • Vaginal discharge    • Rubella non-immune status, antepartum    No evidence of spontaneous rupture of membranes.  Plan GBS next visit.  Discussed with patient.  Labor warnings given.  Follow-up in 1 week.    3) Reviewed this stage of pregnancy  4) Problem list updated     Return in about 1 week (around 5/10/2023) for OB INT, GBS.    I spent 20 minutes caring for Tanna on this date of service. This time includes time spent by me in the following activities: preparing for the visit, reviewing tests, obtaining and/or reviewing a separately obtained history, performing a medically appropriate examination and/or evaluation, counseling and educating the patient/family/caregiver, documenting information in the medical record and independently interpreting results and communicating that information with the patient/family/caregiver.      Constantino Hollis,  MD    5/3/2023  14:41 EDT

## 2023-05-09 ENCOUNTER — PATIENT OUTREACH (OUTPATIENT)
Dept: LABOR AND DELIVERY | Facility: HOSPITAL | Age: 23
End: 2023-05-09
Payer: COMMERCIAL

## 2023-05-09 NOTE — OUTREACH NOTE
[facility]  Check-In    Current Estimated Gestational Age: 36w0d    Questions/Answers    Flowsheet Row Responses   Best Method for Contacting Cell   Demographics Reviewed Yes   Currently Employed Yes   Able to keep appointments as scheduled Yes   Gender(s) and Name(s) m   Baby Active/Feeling Fetal Movemen Yes   How are you presently feeling? good   Supplies ready for baby Car Seat, Clothing, Crib, Diapers, Feeding Supplies   Resource/Environmental Concerns None   Do you have any questions related to your care experience, your pregnancy, plans for delivery, any concerns, etc? No        Pt doing well, says she has been feeling a lot more movements lately. Labor warning s/s discussed. Pt interested in [employer] class being held today.  MNN scheduled for 11, called pt back to confirm, she did not answer and no vm available.  36 wk packet sent with appt time for GB class.    Will follow for delivery.    Yared Arndt RN  Maternity Nurse Navigator    5/9/2023, 09:15 EDT

## 2023-05-10 ENCOUNTER — ROUTINE PRENATAL (OUTPATIENT)
Dept: OBSTETRICS AND GYNECOLOGY | Facility: CLINIC | Age: 23
End: 2023-05-10
Payer: COMMERCIAL

## 2023-05-10 ENCOUNTER — HOSPITAL ENCOUNTER (OUTPATIENT)
Facility: HOSPITAL | Age: 23
Discharge: HOME | End: 2023-05-10
Attending: OBSTETRICS & GYNECOLOGY | Admitting: OBSTETRICS & GYNECOLOGY
Payer: COMMERCIAL

## 2023-05-10 VITALS
DIASTOLIC BLOOD PRESSURE: 60 MMHG | TEMPERATURE: 98.3 F | HEART RATE: 72 BPM | SYSTOLIC BLOOD PRESSURE: 118 MMHG | RESPIRATION RATE: 16 BRPM

## 2023-05-10 VITALS — BODY MASS INDEX: 35.54 KG/M2 | WEIGHT: 182 LBS | SYSTOLIC BLOOD PRESSURE: 162 MMHG | DIASTOLIC BLOOD PRESSURE: 102 MMHG

## 2023-05-10 DIAGNOSIS — Z36.85 ENCOUNTER FOR ANTENATAL SCREENING FOR STREPTOCOCCUS B: ICD-10-CM

## 2023-05-10 DIAGNOSIS — O13.3 GESTATIONAL HYPERTENSION, THIRD TRIMESTER: ICD-10-CM

## 2023-05-10 DIAGNOSIS — Z34.93 PRENATAL CARE IN THIRD TRIMESTER: Primary | ICD-10-CM

## 2023-05-10 LAB
ALBUMIN SERPL-MCNC: 3.4 G/DL (ref 3.5–5.2)
ALBUMIN/GLOB SERPL: 1.1 G/DL
ALP SERPL-CCNC: 170 U/L (ref 39–117)
ALT SERPL W P-5'-P-CCNC: 18 U/L (ref 1–33)
ANION GAP SERPL CALCULATED.3IONS-SCNC: 9.4 MMOL/L (ref 5–15)
AST SERPL-CCNC: 22 U/L (ref 1–32)
BILIRUB SERPL-MCNC: 0.2 MG/DL (ref 0–1.2)
BILIRUB UR QL STRIP: NEGATIVE
BUN SERPL-MCNC: 10 MG/DL (ref 6–20)
BUN/CREAT SERPL: 11.8 (ref 7–25)
CALCIUM SPEC-SCNC: 8.8 MG/DL (ref 8.6–10.5)
CHLORIDE SERPL-SCNC: 103 MMOL/L (ref 98–107)
CLARITY UR: CLEAR
CO2 SERPL-SCNC: 21.6 MMOL/L (ref 22–29)
COLOR UR: YELLOW
CREAT SERPL-MCNC: 0.85 MG/DL (ref 0.57–1)
CREAT UR-MCNC: 35.1 MG/DL
DEPRECATED RDW RBC AUTO: 45.2 FL (ref 37–54)
EGFRCR SERPLBLD CKD-EPI 2021: 99.5 ML/MIN/1.73
ERYTHROCYTE [DISTWIDTH] IN BLOOD BY AUTOMATED COUNT: 13.2 % (ref 12.3–15.4)
GLOBULIN UR ELPH-MCNC: 3 GM/DL
GLUCOSE SERPL-MCNC: 78 MG/DL (ref 65–99)
GLUCOSE UR STRIP-MCNC: NEGATIVE MG/DL
GLUCOSE UR STRIP-MCNC: NEGATIVE MG/DL
HCT VFR BLD AUTO: 31.1 % (ref 34–46.6)
HGB BLD-MCNC: 10.4 G/DL (ref 12–15.9)
HGB UR QL STRIP.AUTO: NEGATIVE
KETONES UR QL STRIP: NEGATIVE
LEUKOCYTE ESTERASE UR QL STRIP.AUTO: NEGATIVE
MCH RBC QN AUTO: 31.5 PG (ref 26.6–33)
MCHC RBC AUTO-ENTMCNC: 33.4 G/DL (ref 31.5–35.7)
MCV RBC AUTO: 94.2 FL (ref 79–97)
NITRITE UR QL STRIP: NEGATIVE
PH UR STRIP.AUTO: 7 [PH] (ref 4.5–8)
PLATELET # BLD AUTO: 175 10*3/MM3 (ref 140–450)
PMV BLD AUTO: 11 FL (ref 6–12)
POTASSIUM SERPL-SCNC: 4.1 MMOL/L (ref 3.5–5.2)
PROT ?TM UR-MCNC: 4.9 MG/DL
PROT SERPL-MCNC: 6.4 G/DL (ref 6–8.5)
PROT UR QL STRIP: NEGATIVE
PROT UR STRIP-MCNC: NEGATIVE MG/DL
PROT/CREAT UR: 139.6 MG/G CREA (ref 0–200)
RBC # BLD AUTO: 3.3 10*6/MM3 (ref 3.77–5.28)
SODIUM SERPL-SCNC: 134 MMOL/L (ref 136–145)
SP GR UR STRIP: 1.01 (ref 1–1.03)
UROBILINOGEN UR QL STRIP: NORMAL
WBC NRBC COR # BLD: 10.16 10*3/MM3 (ref 3.4–10.8)

## 2023-05-10 PROCEDURE — 80053 COMPREHEN METABOLIC PANEL: CPT | Performed by: OBSTETRICS & GYNECOLOGY

## 2023-05-10 PROCEDURE — 59025 FETAL NON-STRESS TEST: CPT

## 2023-05-10 PROCEDURE — 81003 URINALYSIS AUTO W/O SCOPE: CPT | Performed by: OBSTETRICS & GYNECOLOGY

## 2023-05-10 PROCEDURE — G0463 HOSPITAL OUTPT CLINIC VISIT: HCPCS

## 2023-05-10 PROCEDURE — 85027 COMPLETE CBC AUTOMATED: CPT | Performed by: OBSTETRICS & GYNECOLOGY

## 2023-05-10 PROCEDURE — 84156 ASSAY OF PROTEIN URINE: CPT | Performed by: OBSTETRICS & GYNECOLOGY

## 2023-05-10 PROCEDURE — 82570 ASSAY OF URINE CREATININE: CPT | Performed by: OBSTETRICS & GYNECOLOGY

## 2023-05-10 PROCEDURE — 87086 URINE CULTURE/COLONY COUNT: CPT | Performed by: OBSTETRICS & GYNECOLOGY

## 2023-05-10 NOTE — NON STRESS TEST
Tanna Jones, a  at 36w1d with an RUMA of 2023, by Last Menstrual Period, was seen at [facility] for a nonstress test.    Chief Complaint   Patient presents with   • Elevated Blood Pressure       Patient Active Problem List   Diagnosis   • Former smoker   • Pregnancy   • Rubella non-immune status, antepartum   • Polyhydramnios, antepartum; resolved   • Prenatal care in third trimester       Start Time: 1536  Stop Time: 1625    Interpretation A  Nonstress Test Interpretation A: Reactive

## 2023-05-10 NOTE — NURSING NOTE
D/c instructions discussed w/ pt and spouse - they both verbalized understanding and all questions answered.  Pt scheduled to have BP check and see MD in office on Friday.  Pt aware of s/s to call office or return to hospital.  Pt given work noted to stay on modified bedrest until further evaluation.  Pt denies any needs or concerns at this time and d/c'd home in stable ambulatory condition.

## 2023-05-10 NOTE — NURSING NOTE
Pt sent from office for elevated BP.  Pt hooked up to monitor for NST and serial BP and labs drawn, urine collected.  Pt denies pain, H/A and is feeling her baby move well.  She states she noticed some blurry vision while driving today but isn't sure if it's d/t a stye she currently has in her left eye.    Awaiting lab results.

## 2023-05-10 NOTE — LETTER
May 10, 2023     Patient: Tanna Jones   YOB: 2000   Date of Visit: 5/10/2023       To Whom It May Concern:    It is my medical opinion that Tanna Jones be released from work 05/10/2023 -05/12/2023.             Sincerely,      Marilia BEACH RN [facility] [place]

## 2023-05-10 NOTE — PROGRESS NOTES
OB follow up     Tanna Jones is a 22 y.o.  36w1d being seen today for her obstetrical visit.  Patient reports no bleeding, no contractions and no leaking. Fetal movement: normal.  Prenatal care complicated by rubella nonimmune.  Currently the patient denies any headache or visual changes.  She has not had no further leaking like she complained of last week.  She is here for GBS collection.    Review of Systems  No bleeding, No cramping/contractions     BP (!) 162/102   Wt 82.6 kg (182 lb)   LMP 2022   BMI 35.54 kg/m²     FHT:   BPM   Uterine Size:     Blood pressure noted.  About the same on repeat 15 minutes later.    Assessment/Plan:    1) 22 y.o.  -pregnancy at 36w1d    2)   Encounter Diagnoses   Name Primary?   • Prenatal care in third trimester Yes   • Encounter for  screening for Streptococcus B    • Gestational hypertension, third trimester    Elevated blood pressure, concern for preeclampsia.  No weight gain since last week.  Swelling has increased compared to last week's exam.  Recommend patient go to labor and delivery for monitoring, labs, serial vital signs and urine evaluation.  Preeclampsia natural history discussed with the parents.  Agrees with plan.    3) Reviewed this stage of pregnancy  4) Problem list updated     Return for to L&D.    I spent 20 minutes caring for Tanna on this date of service. This time includes time spent by me in the following activities: preparing for the visit, reviewing tests, obtaining and/or reviewing a separately obtained history, performing a medically appropriate examination and/or evaluation, counseling and educating the patient/family/caregiver, ordering medications, tests, or procedures, referring and communicating with other health care professionals and documenting information in the medical record      Constantino Hollis MD    5/10/2023  15:17 EDT

## 2023-05-11 ENCOUNTER — HOSPITAL ENCOUNTER (OUTPATIENT)
Facility: HOSPITAL | Age: 23
Discharge: HOME | End: 2023-05-12
Attending: OBSTETRICS & GYNECOLOGY | Admitting: OBSTETRICS & GYNECOLOGY
Payer: COMMERCIAL

## 2023-05-11 DIAGNOSIS — I10 CHRONIC HYPERTENSION: ICD-10-CM

## 2023-05-11 LAB
AMPHET+METHAMPHET UR QL: NEGATIVE
AMPHETAMINES UR QL: NEGATIVE
BACTERIA SPEC AEROBE CULT: NO GROWTH
BACTERIA UR QL AUTO: ABNORMAL /HPF
BARBITURATES UR QL SCN: NEGATIVE
BENZODIAZ UR QL SCN: NEGATIVE
BILIRUB UR QL STRIP: NEGATIVE
BUPRENORPHINE SERPL-MCNC: NEGATIVE NG/ML
CANNABINOIDS SERPL QL: NEGATIVE
CLARITY UR: ABNORMAL
COCAINE UR QL: NEGATIVE
COLOR UR: YELLOW
GLUCOSE UR STRIP-MCNC: NEGATIVE MG/DL
HGB UR QL STRIP.AUTO: NEGATIVE
HYALINE CASTS UR QL AUTO: ABNORMAL /LPF
KETONES UR QL STRIP: NEGATIVE
LEUKOCYTE ESTERASE UR QL STRIP.AUTO: ABNORMAL
METHADONE UR QL SCN: NEGATIVE
NITRITE UR QL STRIP: NEGATIVE
OPIATES UR QL: NEGATIVE
OXYCODONE UR QL SCN: NEGATIVE
PCP UR QL SCN: NEGATIVE
PH UR STRIP.AUTO: 6 [PH] (ref 4.5–8)
PROPOXYPH UR QL: NEGATIVE
PROT UR QL STRIP: NEGATIVE
RBC # UR STRIP: ABNORMAL /HPF
REF LAB TEST METHOD: ABNORMAL
SP GR UR STRIP: <=1.005 (ref 1–1.03)
SQUAMOUS #/AREA URNS HPF: ABNORMAL /HPF
TRICYCLICS UR QL SCN: NEGATIVE
UROBILINOGEN UR QL STRIP: ABNORMAL
WBC # UR STRIP: ABNORMAL /HPF

## 2023-05-11 PROCEDURE — 80306 DRUG TEST PRSMV INSTRMNT: CPT | Performed by: OBSTETRICS & GYNECOLOGY

## 2023-05-11 PROCEDURE — 84156 ASSAY OF PROTEIN URINE: CPT | Performed by: OBSTETRICS & GYNECOLOGY

## 2023-05-11 PROCEDURE — 96372 THER/PROPH/DIAG INJ SC/IM: CPT

## 2023-05-11 PROCEDURE — G0463 HOSPITAL OUTPT CLINIC VISIT: HCPCS

## 2023-05-11 PROCEDURE — 81001 URINALYSIS AUTO W/SCOPE: CPT | Performed by: OBSTETRICS & GYNECOLOGY

## 2023-05-11 PROCEDURE — 87086 URINE CULTURE/COLONY COUNT: CPT | Performed by: OBSTETRICS & GYNECOLOGY

## 2023-05-11 PROCEDURE — 82570 ASSAY OF URINE CREATININE: CPT | Performed by: OBSTETRICS & GYNECOLOGY

## 2023-05-11 PROCEDURE — 25010000002 BETAMETHASONE ACET & SOD PHOS PER 4 MG: Performed by: OBSTETRICS & GYNECOLOGY

## 2023-05-11 RX ORDER — BETAMETHASONE SODIUM PHOSPHATE AND BETAMETHASONE ACETATE 3; 3 MG/ML; MG/ML
12 INJECTION, SUSPENSION INTRA-ARTICULAR; INTRALESIONAL; INTRAMUSCULAR; SOFT TISSUE EVERY 24 HOURS
Status: COMPLETED | OUTPATIENT
Start: 2023-05-11 | End: 2023-05-12

## 2023-05-11 RX ADMIN — BETAMETHASONE ACETATE AND BETAMETHASONE SODIUM PHOSPHATE 12 MG: 3; 3 INJECTION, SUSPENSION INTRA-ARTICULAR; INTRALESIONAL; INTRAMUSCULAR; SOFT TISSUE at 20:34

## 2023-05-12 VITALS
DIASTOLIC BLOOD PRESSURE: 69 MMHG | RESPIRATION RATE: 18 BRPM | OXYGEN SATURATION: 98 % | HEART RATE: 82 BPM | TEMPERATURE: 98.1 F | SYSTOLIC BLOOD PRESSURE: 138 MMHG

## 2023-05-12 PROBLEM — O16.9 ELEVATED BLOOD PRESSURE AFFECTING PREGNANCY, ANTEPARTUM: Status: ACTIVE | Noted: 2023-05-12

## 2023-05-12 PROBLEM — Z34.93 PRENATAL CARE IN THIRD TRIMESTER: Status: RESOLVED | Noted: 2023-03-27 | Resolved: 2023-05-12

## 2023-05-12 LAB
ALBUMIN SERPL-MCNC: 3.2 G/DL (ref 3.5–5.2)
ALBUMIN/GLOB SERPL: 1.1 G/DL
ALP SERPL-CCNC: 167 U/L (ref 39–117)
ALT SERPL W P-5'-P-CCNC: 16 U/L (ref 1–33)
ANION GAP SERPL CALCULATED.3IONS-SCNC: 11.8 MMOL/L (ref 5–15)
AST SERPL-CCNC: 17 U/L (ref 1–32)
BACTERIA SPEC AEROBE CULT: NORMAL
BILIRUB SERPL-MCNC: 0.2 MG/DL (ref 0–1.2)
BUN SERPL-MCNC: 10 MG/DL (ref 6–20)
BUN/CREAT SERPL: 12.7 (ref 7–25)
CALCIUM SPEC-SCNC: 8.7 MG/DL (ref 8.6–10.5)
CHLORIDE SERPL-SCNC: 104 MMOL/L (ref 98–107)
CO2 SERPL-SCNC: 20.2 MMOL/L (ref 22–29)
CREAT SERPL-MCNC: 0.79 MG/DL (ref 0.57–1)
CREAT UR-MCNC: 22.1 MG/DL
DEPRECATED RDW RBC AUTO: 43.5 FL (ref 37–54)
EGFRCR SERPLBLD CKD-EPI 2021: 108.6 ML/MIN/1.73
ERYTHROCYTE [DISTWIDTH] IN BLOOD BY AUTOMATED COUNT: 12.9 % (ref 12.3–15.4)
GLOBULIN UR ELPH-MCNC: 3 GM/DL
GLUCOSE SERPL-MCNC: 128 MG/DL (ref 65–99)
GP B STREP DNA SPEC QL NAA+PROBE: NEGATIVE
HCT VFR BLD AUTO: 31 % (ref 34–46.6)
HGB BLD-MCNC: 10.4 G/DL (ref 12–15.9)
MCH RBC QN AUTO: 31.1 PG (ref 26.6–33)
MCHC RBC AUTO-ENTMCNC: 33.5 G/DL (ref 31.5–35.7)
MCV RBC AUTO: 92.8 FL (ref 79–97)
PLATELET # BLD AUTO: 170 10*3/MM3 (ref 140–450)
PMV BLD AUTO: 11.1 FL (ref 6–12)
POTASSIUM SERPL-SCNC: 4.1 MMOL/L (ref 3.5–5.2)
PROT ?TM UR-MCNC: 5.8 MG/DL
PROT SERPL-MCNC: 6.2 G/DL (ref 6–8.5)
PROT/CREAT UR: 262.4 MG/G CREA (ref 0–200)
RBC # BLD AUTO: 3.34 10*6/MM3 (ref 3.77–5.28)
SODIUM SERPL-SCNC: 136 MMOL/L (ref 136–145)
WBC NRBC COR # BLD: 9.53 10*3/MM3 (ref 3.4–10.8)

## 2023-05-12 PROCEDURE — 80053 COMPREHEN METABOLIC PANEL: CPT | Performed by: OBSTETRICS & GYNECOLOGY

## 2023-05-12 PROCEDURE — 59025 FETAL NON-STRESS TEST: CPT

## 2023-05-12 PROCEDURE — 99214 OFFICE O/P EST MOD 30 MIN: CPT | Performed by: OBSTETRICS & GYNECOLOGY

## 2023-05-12 PROCEDURE — 25010000002 BETAMETHASONE ACET & SOD PHOS PER 4 MG: Performed by: OBSTETRICS & GYNECOLOGY

## 2023-05-12 PROCEDURE — 85027 COMPLETE CBC AUTOMATED: CPT | Performed by: OBSTETRICS & GYNECOLOGY

## 2023-05-12 PROCEDURE — 96372 THER/PROPH/DIAG INJ SC/IM: CPT

## 2023-05-12 RX ADMIN — BETAMETHASONE ACETATE AND BETAMETHASONE SODIUM PHOSPHATE 12 MG: 3; 3 INJECTION, SUSPENSION INTRA-ARTICULAR; INTRALESIONAL; INTRAMUSCULAR; SOFT TISSUE at 20:28

## 2023-05-12 NOTE — NON STRESS TEST
Tanna Jones, a  at 36w3d with an RUMA of 2023, by Last Menstrual Period, was seen at [facility] for a nonstress test.    Chief Complaint   Patient presents with   • Elevated Blood Pressure       Patient Active Problem List   Diagnosis   • Former smoker   • Pregnancy   • Rubella non-immune status, antepartum   • Polyhydramnios, antepartum; resolved   • Prenatal care in third trimester       Start Time: 0725  Stop Time: 0750    Interpretation A  Nonstress Test Interpretation A: Reactive

## 2023-05-12 NOTE — NURSING NOTE
Dr. Morel on phone, aware patient  at 36 2/7 with complaints of elevated blood pressure. Aware patient states was at [facility] today and started feeling hot and foggy headed. Checked her blood pressure multiple times at home with wrist cuff, Readings were all 170's-180's/100's. Aware patient denies headache or visual disturbances. Mild edema, pt states not any worse. Denies pain. Aware was here Monday for the same complaints. Aware labs including protein creatinine ratio. Orders to admit overnight, vitals q 4, NST Q shift, regular diet, saline lock. CMP and CBC in am.

## 2023-05-12 NOTE — PLAN OF CARE
Problem: Adult Inpatient Plan of Care  Goal: Plan of Care Review  Outcome: Met  Goal: Patient-Specific Goal (Individualized)  Outcome: Met  Goal: Absence of Hospital-Acquired Illness or Injury  Outcome: Met  Intervention: Identify and Manage Fall Risk  Recent Flowsheet Documentation  Taken 5/12/2023 1505 by Imelda Ascencio RN  Safety Promotion/Fall Prevention: safety round/check completed  Taken 5/12/2023 1400 by Imelda Ascencio RN  Safety Promotion/Fall Prevention: safety round/check completed  Taken 5/12/2023 1200 by Imelda Ascencio RN  Safety Promotion/Fall Prevention: safety round/check completed  Taken 5/12/2023 1015 by Imelda Ascencio RN  Safety Promotion/Fall Prevention: safety round/check completed  Taken 5/12/2023 0745 by Imelda Ascencio RN  Safety Promotion/Fall Prevention: safety round/check completed  Intervention: Prevent and Manage VTE (Venous Thromboembolism) Risk  Recent Flowsheet Documentation  Taken 5/12/2023 0745 by Imelda Ascencio RN  Activity Management: up ad zuleika  Goal: Optimal Comfort and Wellbeing  Outcome: Met  Intervention: Provide Person-Centered Care  Recent Flowsheet Documentation  Taken 5/12/2023 0745 by Imelda Ascencio RN  Trust Relationship/Rapport:   care explained   choices provided   emotional support provided   empathic listening provided   questions answered   reassurance provided   questions encouraged   thoughts/feelings acknowledged  Goal: Readiness for Transition of Care  Outcome: Met   Goal Outcome Evaluation:

## 2023-05-12 NOTE — SIGNIFICANT NOTE
Discharge instructions reviewed with patient.. Pt verbalized understanding regarding bringing her 24 hour urine to her appt on Monday. Notified her her 24hr urine collection is to be stopped at 7 am and Jug is to be kept cold. She verbalized understanding. Pt verbalized understanding of instructions and questions answered.

## 2023-05-12 NOTE — NON STRESS TEST
Tanna Jones, a  at 36w2d with an RUMA of 2023, by Last Menstrual Period, was seen at [facility] for a nonstress test.    Chief Complaint   Patient presents with   • Elevated Blood Pressure       Patient Active Problem List   Diagnosis   • Former smoker   • Pregnancy   • Rubella non-immune status, antepartum   • Polyhydramnios, antepartum; resolved   • Prenatal care in third trimester       Start Time:   Stop Time:

## 2023-05-15 ENCOUNTER — ROUTINE PRENATAL (OUTPATIENT)
Dept: OBSTETRICS AND GYNECOLOGY | Facility: CLINIC | Age: 23
End: 2023-05-15
Payer: COMMERCIAL

## 2023-05-15 VITALS — DIASTOLIC BLOOD PRESSURE: 98 MMHG | SYSTOLIC BLOOD PRESSURE: 150 MMHG | BODY MASS INDEX: 35.15 KG/M2 | WEIGHT: 180 LBS

## 2023-05-15 DIAGNOSIS — O40.9XX0 POLYHYDRAMNIOS, ANTEPARTUM, SINGLE OR UNSPECIFIED FETUS: ICD-10-CM

## 2023-05-15 DIAGNOSIS — O14.93 PRE-ECLAMPSIA IN THIRD TRIMESTER: ICD-10-CM

## 2023-05-15 DIAGNOSIS — Z3A.36 36 WEEKS GESTATION OF PREGNANCY: ICD-10-CM

## 2023-05-15 DIAGNOSIS — Z28.39 RUBELLA NON-IMMUNE STATUS, ANTEPARTUM: ICD-10-CM

## 2023-05-15 DIAGNOSIS — O16.9 ELEVATED BLOOD PRESSURE AFFECTING PREGNANCY, ANTEPARTUM: Primary | ICD-10-CM

## 2023-05-15 DIAGNOSIS — O09.899 RUBELLA NON-IMMUNE STATUS, ANTEPARTUM: ICD-10-CM

## 2023-05-15 LAB
GLUCOSE UR STRIP-MCNC: NEGATIVE MG/DL
PROT UR STRIP-MCNC: NEGATIVE MG/DL

## 2023-05-15 NOTE — PROGRESS NOTES
OB follow up     Chief Complaint: PNC FU    Tanna Jones is a 22 y.o.  36+6 being seen today for her obstetrical visit.  Patient reports no complaints. Fetal movement: normal. Taking PNV. Denies s/s of Pre-e SF. Was recent admission for GHTN and received BMZ 2 doses     Review of Systems  No bleeding, No cramping/contractions     /98   Wt 81.6 kg (180 lb)   LMP 2022   BMI 35.15 kg/m²     Vitals: VSS; AF    General Appearance:  Awake. Alert. Well developed. Well nourished. In no acute distress.    Visual Inspection: ° Abdomen was normal on visual inspection.  Palpation: ° Abdomen was soft. ° Abdominal non-tender.    Uterus: ° Fundal height was normal for gestational age. ° Not tender.  Uterine Adnexae: ° Normal without masses or tenderness.  Neurological:  ° Oriented to time, place, and person.  Skin:  ° General appearance was normal. No bruising or ecchymosis.  Obstetrical: +FM and FCA       Presentation: Breech  Placenta: Anterior  LATASHA: 16.08cm  FCA: 130's    BPP 6/8 ( No breathing)         Ultrasound images and report reviewed personally by me.    Full interpretation of ultrasound can be found in the patient's note on the same date of service.     Miguel Mcneill, DO    NST reactive ( > 20 minutes)       Assessment/Plan: Low risk pregnancy    1) pregnancy at 36+6  GBS negative Z  Breech     2) RNI: vaccinate PP.    3) declines flu vaccine    4) Anemia-  Is not taking iron. Hgb 11.4g/dL. ERX iron.     5) Pre-e with out SF   NST/BPP today 8/10 reassuring   PIH labs and f/u Wednesday   Denies s/s of Pre-e SF today; return precautions discussed   24hr UA pending     6) Breech presentation   If Breech Wednesday could consider LTCS for Pr-ee- SF on Thursday; Pt not to eat if needs Delivery Wednesday     7) S/P tDap vaccine    Reviewed this stage of pregnancy  Problem list updated   RTO Wednesday, PIH labs ( CMP, CBC , +/- Pr:Cr ratio)   Discussed with my partner   Has had BMZ [id number]; completed      I spent >45 minutes caring for Tanna on this date of service. This time includes time spent by me in the following activities: preparing for the visit, reviewing tests, obtaining and/or reviewing a separately obtained history, performing a medically appropriate examination and/or evaluation, counseling and educating the patient/family/caregiver, ordering medications, tests, or procedures, documenting information in the medical record, independently interpreting results and communicating that information with the patient/family/caregiver and care coordination     Miguel Mcneill DO  5/15/2023  16:17 EDT

## 2023-05-16 LAB
PROT 24H UR-MRATE: 156 MG/24HOURS (ref 0–150)
PROT UR-MCNC: 5.2 MG/DL

## 2023-05-17 ENCOUNTER — ROUTINE PRENATAL (OUTPATIENT)
Dept: OBSTETRICS AND GYNECOLOGY | Facility: CLINIC | Age: 23
End: 2023-05-17
Payer: COMMERCIAL

## 2023-05-17 ENCOUNTER — TELEPHONE (OUTPATIENT)
Dept: OBSTETRICS AND GYNECOLOGY | Facility: HOSPITAL | Age: 23
End: 2023-05-17

## 2023-05-17 VITALS — SYSTOLIC BLOOD PRESSURE: 140 MMHG | BODY MASS INDEX: 35.35 KG/M2 | DIASTOLIC BLOOD PRESSURE: 98 MMHG | WEIGHT: 181 LBS

## 2023-05-17 DIAGNOSIS — O16.9 ELEVATED BLOOD PRESSURE AFFECTING PREGNANCY, ANTEPARTUM: Primary | ICD-10-CM

## 2023-05-17 PROBLEM — O13.3 GESTATIONAL HYPERTENSION WITHOUT SIGNIFICANT PROTEINURIA IN THIRD TRIMESTER: Status: ACTIVE | Noted: 2023-05-17

## 2023-05-17 PROBLEM — O14.93 PRE-ECLAMPSIA IN THIRD TRIMESTER: Status: RESOLVED | Noted: 2023-05-15 | Resolved: 2023-05-17

## 2023-05-17 LAB
GLUCOSE UR STRIP-MCNC: NEGATIVE MG/DL
PROT UR STRIP-MCNC: ABNORMAL MG/DL

## 2023-05-17 PROCEDURE — S0260 H&P FOR SURGERY: HCPCS | Performed by: OBSTETRICS & GYNECOLOGY

## 2023-05-17 RX ORDER — ACETAMINOPHEN 500 MG
1000 TABLET ORAL ONCE
Status: CANCELLED | OUTPATIENT
Start: 2023-05-17 | End: 2023-05-17

## 2023-05-17 RX ORDER — SODIUM CHLORIDE, SODIUM LACTATE, POTASSIUM CHLORIDE, CALCIUM CHLORIDE 600; 310; 30; 20 MG/100ML; MG/100ML; MG/100ML; MG/100ML
125 INJECTION, SOLUTION INTRAVENOUS CONTINUOUS
Status: CANCELLED | OUTPATIENT
Start: 2023-05-17

## 2023-05-17 RX ORDER — CARBOPROST TROMETHAMINE 250 UG/ML
250 INJECTION, SOLUTION INTRAMUSCULAR AS NEEDED
Status: CANCELLED | OUTPATIENT
Start: 2023-05-17

## 2023-05-17 RX ORDER — METHYLERGONOVINE MALEATE 0.2 MG/ML
200 INJECTION INTRAVENOUS ONCE AS NEEDED
Status: CANCELLED | OUTPATIENT
Start: 2023-05-17

## 2023-05-17 RX ORDER — KETOROLAC TROMETHAMINE 15 MG/ML
30 INJECTION, SOLUTION INTRAMUSCULAR; INTRAVENOUS ONCE
Status: CANCELLED | OUTPATIENT
Start: 2023-05-17 | End: 2023-05-17

## 2023-05-17 RX ORDER — MISOPROSTOL 100 UG/1
800 TABLET ORAL AS NEEDED
Status: CANCELLED | OUTPATIENT
Start: 2023-05-17

## 2023-05-17 RX ORDER — LIDOCAINE HYDROCHLORIDE 10 MG/ML
5 INJECTION, SOLUTION EPIDURAL; INFILTRATION; INTRACAUDAL; PERINEURAL AS NEEDED
Status: CANCELLED | OUTPATIENT
Start: 2023-05-17

## 2023-05-17 RX ORDER — SODIUM CHLORIDE 0.9 % (FLUSH) 0.9 %
10 SYRINGE (ML) INJECTION EVERY 12 HOURS SCHEDULED
Status: CANCELLED | OUTPATIENT
Start: 2023-05-17

## 2023-05-17 RX ORDER — SODIUM CHLORIDE 0.9 % (FLUSH) 0.9 %
1-10 SYRINGE (ML) INJECTION AS NEEDED
Status: CANCELLED | OUTPATIENT
Start: 2023-05-17

## 2023-05-17 NOTE — PROGRESS NOTES
S:  cc: Pt here for ob office visit and NST for GHTN w/ proteinuria.    hpi: Tanna Jones is a 22 y.o.  37w1d being seen today for her obstetrical visit.   Patient reports no complaints .   Fetal movement: normal. .      Social History     Social History Narrative   • Not on file      SMOKER? No      O:  /98   Wt 82.1 kg (181 lb)   LMP 2022   BMI 35.35 kg/m²     Prenatal Assessment  Fetal Heart Rate: present  Fundal Height (cm): 37 cm  Movement: Present  Presentation: Breech  Prenatal Vitals  BP: 140/98  Weight: 82.1 kg (181 lb)  Urine Glucose/Protein  Urine Glucose Read-only: Negative  Urine Protein Read-only: (!) Trace    Brief Urine Lab Results  (Last result in the past 365 days)      Color   Clarity   Blood   Leuk Est   Nitrite   Protein   CREAT   Urine HCG        23 0000           Trace               NST = reactive    A:    DIAGNOSES:  22 y.o.  37w1d  Diagnoses and all orders for this visit:    1. Elevated blood pressure affecting pregnancy, antepartum (Primary)  -     Case Request; Standing  -     lidocaine PF 1% (XYLOCAINE) injection 5 mL  -     sodium chloride 0.9 % flush 10 mL  -     sodium chloride 0.9 % flush 1-10 mL  -     lactated ringers bolus 1,000 mL  -     lactated ringers infusion  -     acetaminophen (TYLENOL) tablet 1,000 mg  -     ketorolac (TORADOL) injection 30 mg  -     methylergonovine (METHERGINE) injection 200 mcg  -     carboprost (HEMABATE) injection 250 mcg  -     miSOPROStol (CYTOTEC) tablet 800 mcg  -     ceFAZolin (ANCEF) 2 g in sodium chloride 0.9 % 100 mL IVPB  -     azithromycin (ZITHROMAX) 500 mg in sodium chloride 0.9 % 250 mL IVPB  -     Case Request    2. Breech  -     Case Request; Standing  -     lidocaine PF 1% (XYLOCAINE) injection 5 mL  -     sodium chloride 0.9 % flush 10 mL  -     sodium chloride 0.9 % flush 1-10 mL  -     lactated ringers bolus 1,000 mL  -     lactated ringers infusion  -     acetaminophen (TYLENOL) tablet 1,000 mg  -      ketorolac (TORADOL) injection 30 mg  -     methylergonovine (METHERGINE) injection 200 mcg  -     carboprost (HEMABATE) injection 250 mcg  -     miSOPROStol (CYTOTEC) tablet 800 mcg  -     ceFAZolin (ANCEF) 2 g in sodium chloride 0.9 % 100 mL IVPB  -     azithromycin (ZITHROMAX) 500 mg in sodium chloride 0.9 % 250 mL IVPB  -     Case Request    Other orders  -     Admit To Obstetrics Inpatient; Standing  -     Obtain informed consent; Standing  -     Vital Signs Per Hospital Policy; Standing  -     Initiate Group Beta Strep (GBS) Prophylaxis Protocol, If Criteria Met; Standing  -     Insert Indwelling Urinary Catheter; Standing  -     Assess Need for Indwelling Urinary Catheter - Follow Removal Protocol; Standing  -     Urinary Catheter Care; Standing  -     Abdominal Prep with Clippers; Standing  -     Chlorhexadine Skin Prep Unless Otherwise Indicated; Standing  -     SCD (sequential compression devices); Standing  -     POC Glucose Once; Standing  -     Document Gatorade Consumption Prior to Admission (Yes or No); Standing  -     Continuous Fetal Monitoring With NST on Admission and Prior to Initiation of Oxytocin.; Standing  -     External Uterine Contraction Monitoring; Standing  -     Notify Physician (specified); Standing  -     Notify physician for tachysystole (per hospital algorithm); Standing  -     Notify physician if membranes ruptured, bleeding greater than 1 pad an hour, fetal heart tone abnormality, and severe pain; Standing  -     NPO Diet NPO Type: Ice Chips; Standing  -     Inpatient Consult to Anesthesiology; Standing  -     Type & Screen; Standing  -     CBC (No Diff); Standing  -     Insert Peripheral IV; Standing  -     Saline Lock & Maintain IV Access; Standing  -     Vital Signs Per Hospital Policy; Standing  -     Strict Bed Rest; Standing  -     Fundal & Lochia Check; Standing  -     Fundal & Lochia Check; Standing  -     Notify Physician (specified); Standing  -     Diet: Regular/House  Diet; Texture: Regular Texture (IDDSI 7); Fluid Consistency: Thin (IDDSI 0); Standing      NEW PROBLEMS? no    P: Prim C/S tomorrow for GHTN w/ proteinuria and breech.      RISKS, ALTERNATIVES, COMPLICATIONS OF THE PROCEDURE INCLUDING BUT NOT LIMITED TO:    INTRAOPERATIVE RISKS: INJURY TO INTERNAL AND ADJACENT ORGANS AND STRUCTURES (BOWEL, BLADDER, URETER,BLOOD VESSELS) OR HEMORRHAGE REQUIRING FURTHER SURGERY (LAPAROTOMY),  POSSIBLE NON-DIAGNOSTIC FINDINGS, DISCOVERY OF POSSIBLE MALIGNANCY, INFECTION, AND DEATH;   POSTOP COMPLICATIONS: BLEEDING, INFECTION (REQUIRING POSSIBLE REOPERATION), FAILURE OF GOAL OF SURGERY AND RECURRENCE OF ORIGINAL SYMPTOMS, PNEUMONIA, PULMONARY EMBOLISM, AND DEATH;  WERE EXPLAINED TO THE PT WHO VERBALIZED HER UNDERSTANDING.            Pt instructed to call for results of any testing done today and that failure to call if she has not heard from us could result in inadequate care and treament.  Pt verbalized her understanding.   Time Spent: I spent 30+ minutes caring for Tanna on this date of service. This time includes time spent by me in the following activities: preparing for the visit, reviewing tests, obtaining and/or reviewing a separately obtained history, performing a medically appropriate examination and/or evaluation, counseling and educating the patient/family/caregiver, ordering medications, tests, or procedures, referring and communicating with other health care professionals, documenting information in the medical record, independently interpreting results and communicating that information with the patient/family/caregiver and care coordination.      Roberto Alcala MD  10:11 EDT   05/17/23

## 2023-05-17 NOTE — TELEPHONE ENCOUNTER
Spoke with Tanna today @ 4930. Went over ERAS instructions for tomorrows scheduled C/Section. She verbalized understanding that she is to take X2 Tylenol 500 mg tablets orally this evening and to drink 20 oz of gatorade (not red) two hours before her given arrival time to the hospital which is 12:30pm tomorrow (5/18/23). Very pleasant patient.-Dee RN

## 2023-05-18 ENCOUNTER — ANESTHESIA (OUTPATIENT)
Dept: OBSTETRICS AND GYNECOLOGY | Facility: HOSPITAL | Age: 23
End: 2023-05-18
Payer: COMMERCIAL

## 2023-05-18 ENCOUNTER — ANESTHESIA EVENT (OUTPATIENT)
Dept: OBSTETRICS AND GYNECOLOGY | Facility: HOSPITAL | Age: 23
End: 2023-05-18
Payer: COMMERCIAL

## 2023-05-18 ENCOUNTER — HOSPITAL ENCOUNTER (INPATIENT)
Facility: HOSPITAL | Age: 23
LOS: 2 days | Discharge: HOME | End: 2023-05-20
Attending: OBSTETRICS & GYNECOLOGY | Admitting: OBSTETRICS & GYNECOLOGY
Payer: COMMERCIAL

## 2023-05-18 DIAGNOSIS — O16.9 ELEVATED BLOOD PRESSURE AFFECTING PREGNANCY, ANTEPARTUM: ICD-10-CM

## 2023-05-18 PROBLEM — O40.9XX0 POLYHYDRAMNIOS, ANTEPARTUM: Status: RESOLVED | Noted: 2023-03-15 | Resolved: 2023-05-18

## 2023-05-18 PROBLEM — Z34.90 PREGNANCY: Status: RESOLVED | Noted: 2023-01-17 | Resolved: 2023-05-18

## 2023-05-18 PROBLEM — O13.9 GESTATIONAL HYPERTENSION: Status: ACTIVE | Noted: 2023-05-18

## 2023-05-18 LAB
ABO GROUP BLD: NORMAL
ABO GROUP BLD: NORMAL
AMPHET+METHAMPHET UR QL: NEGATIVE
AMPHETAMINES UR QL: NEGATIVE
BACTERIA UR QL AUTO: ABNORMAL /HPF
BARBITURATES UR QL SCN: NEGATIVE
BENZODIAZ UR QL SCN: NEGATIVE
BILIRUB UR QL STRIP: NEGATIVE
BLD GP AB SCN SERPL QL: NEGATIVE
BUPRENORPHINE SERPL-MCNC: NEGATIVE NG/ML
CANNABINOIDS SERPL QL: NEGATIVE
CLARITY UR: ABNORMAL
COCAINE UR QL: NEGATIVE
COLOR UR: ABNORMAL
DEPRECATED RDW RBC AUTO: 44.5 FL (ref 37–54)
ERYTHROCYTE [DISTWIDTH] IN BLOOD BY AUTOMATED COUNT: 13.1 % (ref 12.3–15.4)
GLUCOSE UR STRIP-MCNC: NEGATIVE MG/DL
HCT VFR BLD AUTO: 32.2 % (ref 34–46.6)
HGB BLD-MCNC: 10.7 G/DL (ref 12–15.9)
HGB UR QL STRIP.AUTO: NEGATIVE
HYALINE CASTS UR QL AUTO: ABNORMAL /LPF
KETONES UR QL STRIP: NEGATIVE
LEUKOCYTE ESTERASE UR QL STRIP.AUTO: ABNORMAL
MCH RBC QN AUTO: 30.9 PG (ref 26.6–33)
MCHC RBC AUTO-ENTMCNC: 33.2 G/DL (ref 31.5–35.7)
MCV RBC AUTO: 93.1 FL (ref 79–97)
METHADONE UR QL SCN: NEGATIVE
NITRITE UR QL STRIP: NEGATIVE
OPIATES UR QL: NEGATIVE
OXYCODONE UR QL SCN: NEGATIVE
PCP UR QL SCN: NEGATIVE
PH UR STRIP.AUTO: 7 [PH] (ref 4.5–8)
PLATELET # BLD AUTO: 192 10*3/MM3 (ref 140–450)
PMV BLD AUTO: 10.6 FL (ref 6–12)
PROPOXYPH UR QL: NEGATIVE
PROT UR QL STRIP: NEGATIVE
RBC # BLD AUTO: 3.46 10*6/MM3 (ref 3.77–5.28)
RBC # UR STRIP: ABNORMAL /HPF
REF LAB TEST METHOD: ABNORMAL
RH BLD: POSITIVE
RH BLD: POSITIVE
SP GR UR STRIP: 1.01 (ref 1–1.03)
SQUAMOUS #/AREA URNS HPF: ABNORMAL /HPF
T&S EXPIRATION DATE: NORMAL
TRICYCLICS UR QL SCN: NEGATIVE
UROBILINOGEN UR QL STRIP: ABNORMAL
WBC # UR STRIP: ABNORMAL /HPF
WBC NRBC COR # BLD: 12.05 10*3/MM3 (ref 3.4–10.8)

## 2023-05-18 PROCEDURE — 86900 BLOOD TYPING SEROLOGIC ABO: CPT

## 2023-05-18 PROCEDURE — 86901 BLOOD TYPING SEROLOGIC RH(D): CPT

## 2023-05-18 PROCEDURE — 86900 BLOOD TYPING SEROLOGIC ABO: CPT | Performed by: OBSTETRICS & GYNECOLOGY

## 2023-05-18 PROCEDURE — 25010000002 FENTANYL CITRATE (PF) 50 MCG/ML SOLUTION: Performed by: NURSE ANESTHETIST, CERTIFIED REGISTERED

## 2023-05-18 PROCEDURE — 86850 RBC ANTIBODY SCREEN: CPT | Performed by: OBSTETRICS & GYNECOLOGY

## 2023-05-18 PROCEDURE — 25010000002 ONDANSETRON PER 1 MG

## 2023-05-18 PROCEDURE — 59515 CESAREAN DELIVERY: CPT | Performed by: OBSTETRICS & GYNECOLOGY

## 2023-05-18 PROCEDURE — 86901 BLOOD TYPING SEROLOGIC RH(D): CPT | Performed by: OBSTETRICS & GYNECOLOGY

## 2023-05-18 PROCEDURE — 25010000002 PHENYLEPHRINE 10 MG/ML SOLUTION: Performed by: NURSE ANESTHETIST, CERTIFIED REGISTERED

## 2023-05-18 PROCEDURE — 88307 TISSUE EXAM BY PATHOLOGIST: CPT

## 2023-05-18 PROCEDURE — 94761 N-INVAS EAR/PLS OXIMETRY MLT: CPT

## 2023-05-18 PROCEDURE — 0 CEFAZOLIN SODIUM-DEXTROSE 2-3 GM-%(50ML) RECONSTITUTED SOLUTION: Performed by: NURSE ANESTHETIST, CERTIFIED REGISTERED

## 2023-05-18 PROCEDURE — 94799 UNLISTED PULMONARY SVC/PX: CPT

## 2023-05-18 PROCEDURE — 25010000002 ONDANSETRON PER 1 MG: Performed by: NURSE ANESTHETIST, CERTIFIED REGISTERED

## 2023-05-18 PROCEDURE — 25010000002 MORPHINE PER 10 MG: Performed by: NURSE ANESTHETIST, CERTIFIED REGISTERED

## 2023-05-18 PROCEDURE — 80306 DRUG TEST PRSMV INSTRMNT: CPT | Performed by: OBSTETRICS & GYNECOLOGY

## 2023-05-18 PROCEDURE — 81001 URINALYSIS AUTO W/SCOPE: CPT | Performed by: OBSTETRICS & GYNECOLOGY

## 2023-05-18 PROCEDURE — 25010000002 METHYLERGONOVINE MALEATE PER 0.2 MG: Performed by: OBSTETRICS & GYNECOLOGY

## 2023-05-18 PROCEDURE — 85027 COMPLETE CBC AUTOMATED: CPT | Performed by: OBSTETRICS & GYNECOLOGY

## 2023-05-18 PROCEDURE — 0 CEFAZOLIN SODIUM-DEXTROSE 2-3 GM-%(50ML) RECONSTITUTED SOLUTION: Performed by: OBSTETRICS & GYNECOLOGY

## 2023-05-18 PROCEDURE — 25010000002 AZITHROMYCIN PER 500 MG: Performed by: OBSTETRICS & GYNECOLOGY

## 2023-05-18 PROCEDURE — 25010000002 KETOROLAC TROMETHAMINE PER 15 MG: Performed by: OBSTETRICS & GYNECOLOGY

## 2023-05-18 RX ORDER — MORPHINE SULFATE 1 MG/ML
INJECTION, SOLUTION EPIDURAL; INTRATHECAL; INTRAVENOUS AS NEEDED
Status: DISCONTINUED | OUTPATIENT
Start: 2023-05-18 | End: 2023-05-18 | Stop reason: SURG

## 2023-05-18 RX ORDER — BUPIVACAINE HYDROCHLORIDE 7.5 MG/ML
INJECTION, SOLUTION EPIDURAL; RETROBULBAR
Status: COMPLETED | OUTPATIENT
Start: 2023-05-18 | End: 2023-05-18

## 2023-05-18 RX ORDER — CEFAZOLIN SODIUM 2 G/50ML
2 SOLUTION INTRAVENOUS ONCE
Status: COMPLETED | OUTPATIENT
Start: 2023-05-18 | End: 2023-05-18

## 2023-05-18 RX ORDER — OXYCODONE HYDROCHLORIDE 5 MG/1
10 TABLET ORAL EVERY 4 HOURS PRN
Status: DISCONTINUED | OUTPATIENT
Start: 2023-05-18 | End: 2023-05-20 | Stop reason: HOSPADM

## 2023-05-18 RX ORDER — FAMOTIDINE 10 MG/ML
INJECTION, SOLUTION INTRAVENOUS
Status: COMPLETED
Start: 2023-05-18 | End: 2023-05-18

## 2023-05-18 RX ORDER — SCOLOPAMINE TRANSDERMAL SYSTEM 1 MG/1
PATCH, EXTENDED RELEASE TRANSDERMAL AS NEEDED
Status: DISCONTINUED | OUTPATIENT
Start: 2023-05-18 | End: 2023-05-18 | Stop reason: SURG

## 2023-05-18 RX ORDER — CEFAZOLIN SODIUM 2 G/50ML
SOLUTION INTRAVENOUS AS NEEDED
Status: DISCONTINUED | OUTPATIENT
Start: 2023-05-18 | End: 2023-05-18 | Stop reason: SURG

## 2023-05-18 RX ORDER — OXYTOCIN/0.9 % SODIUM CHLORIDE 30/500 ML
PLASTIC BAG, INJECTION (ML) INTRAVENOUS
Status: COMPLETED
Start: 2023-05-18 | End: 2023-05-18

## 2023-05-18 RX ORDER — ACETAMINOPHEN 500 MG
1000 TABLET ORAL EVERY 6 HOURS
Status: COMPLETED | OUTPATIENT
Start: 2023-05-18 | End: 2023-05-19

## 2023-05-18 RX ORDER — POLYETHYLENE GLYCOL 3350 17 G/17G
17 POWDER, FOR SOLUTION ORAL DAILY
Status: DISCONTINUED | OUTPATIENT
Start: 2023-05-18 | End: 2023-05-20 | Stop reason: HOSPADM

## 2023-05-18 RX ORDER — IBUPROFEN 600 MG/1
600 TABLET ORAL EVERY 6 HOURS
Status: DISCONTINUED | OUTPATIENT
Start: 2023-05-19 | End: 2023-05-20 | Stop reason: HOSPADM

## 2023-05-18 RX ORDER — MISOPROSTOL 200 UG/1
800 TABLET ORAL AS NEEDED
Status: DISCONTINUED | OUTPATIENT
Start: 2023-05-18 | End: 2023-05-20 | Stop reason: HOSPADM

## 2023-05-18 RX ORDER — ONDANSETRON 2 MG/ML
INJECTION INTRAMUSCULAR; INTRAVENOUS AS NEEDED
Status: DISCONTINUED | OUTPATIENT
Start: 2023-05-18 | End: 2023-05-18 | Stop reason: SURG

## 2023-05-18 RX ORDER — FAMOTIDINE 10 MG/ML
INJECTION, SOLUTION INTRAVENOUS AS NEEDED
Status: DISCONTINUED | OUTPATIENT
Start: 2023-05-18 | End: 2023-05-18 | Stop reason: SURG

## 2023-05-18 RX ORDER — ACETAMINOPHEN 500 MG
1000 TABLET ORAL ONCE
Status: COMPLETED | OUTPATIENT
Start: 2023-05-18 | End: 2023-05-18

## 2023-05-18 RX ORDER — OXYTOCIN/0.9 % SODIUM CHLORIDE 30/500 ML
PLASTIC BAG, INJECTION (ML) INTRAVENOUS CONTINUOUS PRN
Status: DISCONTINUED | OUTPATIENT
Start: 2023-05-18 | End: 2023-05-18 | Stop reason: SURG

## 2023-05-18 RX ORDER — OXYCODONE HYDROCHLORIDE 5 MG/1
5 TABLET ORAL EVERY 4 HOURS PRN
Status: DISCONTINUED | OUTPATIENT
Start: 2023-05-18 | End: 2023-05-20 | Stop reason: HOSPADM

## 2023-05-18 RX ORDER — PRENATAL VIT/IRON FUM/FOLIC AC 27MG-0.8MG
1 TABLET ORAL NIGHTLY
Status: DISCONTINUED | OUTPATIENT
Start: 2023-05-18 | End: 2023-05-20 | Stop reason: HOSPADM

## 2023-05-18 RX ORDER — SODIUM CHLORIDE 0.9 % (FLUSH) 0.9 %
10 SYRINGE (ML) INJECTION EVERY 12 HOURS SCHEDULED
Status: DISCONTINUED | OUTPATIENT
Start: 2023-05-18 | End: 2023-05-18

## 2023-05-18 RX ORDER — PHENYLEPHRINE HYDROCHLORIDE 10 MG/ML
INJECTION INTRAVENOUS AS NEEDED
Status: DISCONTINUED | OUTPATIENT
Start: 2023-05-18 | End: 2023-05-18 | Stop reason: SURG

## 2023-05-18 RX ORDER — ACETAMINOPHEN 325 MG/1
650 TABLET ORAL EVERY 6 HOURS
Status: DISCONTINUED | OUTPATIENT
Start: 2023-05-19 | End: 2023-05-20 | Stop reason: HOSPADM

## 2023-05-18 RX ORDER — SODIUM CHLORIDE, SODIUM LACTATE, POTASSIUM CHLORIDE, CALCIUM CHLORIDE 600; 310; 30; 20 MG/100ML; MG/100ML; MG/100ML; MG/100ML
125 INJECTION, SOLUTION INTRAVENOUS CONTINUOUS
Status: DISCONTINUED | OUTPATIENT
Start: 2023-05-18 | End: 2023-05-18

## 2023-05-18 RX ORDER — DOXYCYCLINE HYCLATE 50 MG/1
324 CAPSULE, GELATIN COATED ORAL
Status: DISCONTINUED | OUTPATIENT
Start: 2023-05-19 | End: 2023-05-20 | Stop reason: HOSPADM

## 2023-05-18 RX ORDER — ONDANSETRON 2 MG/ML
4 INJECTION INTRAMUSCULAR; INTRAVENOUS EVERY 6 HOURS PRN
Status: DISCONTINUED | OUTPATIENT
Start: 2023-05-18 | End: 2023-05-20 | Stop reason: HOSPADM

## 2023-05-18 RX ORDER — DOCUSATE SODIUM 100 MG/1
100 CAPSULE, LIQUID FILLED ORAL 2 TIMES DAILY
Status: DISCONTINUED | OUTPATIENT
Start: 2023-05-18 | End: 2023-05-20 | Stop reason: HOSPADM

## 2023-05-18 RX ORDER — KETOROLAC TROMETHAMINE 30 MG/ML
30 INJECTION, SOLUTION INTRAMUSCULAR; INTRAVENOUS ONCE
Status: COMPLETED | OUTPATIENT
Start: 2023-05-18 | End: 2023-05-18

## 2023-05-18 RX ORDER — ONDANSETRON 2 MG/ML
INJECTION INTRAMUSCULAR; INTRAVENOUS
Status: COMPLETED
Start: 2023-05-18 | End: 2023-05-18

## 2023-05-18 RX ORDER — KETOROLAC TROMETHAMINE 30 MG/ML
15 INJECTION, SOLUTION INTRAMUSCULAR; INTRAVENOUS EVERY 6 HOURS
Status: COMPLETED | OUTPATIENT
Start: 2023-05-18 | End: 2023-05-19

## 2023-05-18 RX ORDER — FENTANYL CITRATE 50 UG/ML
INJECTION, SOLUTION INTRAMUSCULAR; INTRAVENOUS AS NEEDED
Status: DISCONTINUED | OUTPATIENT
Start: 2023-05-18 | End: 2023-05-18 | Stop reason: SURG

## 2023-05-18 RX ORDER — SODIUM CHLORIDE, SODIUM LACTATE, POTASSIUM CHLORIDE, CALCIUM CHLORIDE 600; 310; 30; 20 MG/100ML; MG/100ML; MG/100ML; MG/100ML
125 INJECTION, SOLUTION INTRAVENOUS CONTINUOUS
Status: DISCONTINUED | OUTPATIENT
Start: 2023-05-18 | End: 2023-05-20 | Stop reason: HOSPADM

## 2023-05-18 RX ORDER — SODIUM CHLORIDE 0.9 % (FLUSH) 0.9 %
1-10 SYRINGE (ML) INJECTION AS NEEDED
Status: DISCONTINUED | OUTPATIENT
Start: 2023-05-18 | End: 2023-05-18

## 2023-05-18 RX ORDER — LIDOCAINE HYDROCHLORIDE 10 MG/ML
5 INJECTION, SOLUTION EPIDURAL; INFILTRATION; INTRACAUDAL; PERINEURAL AS NEEDED
Status: DISCONTINUED | OUTPATIENT
Start: 2023-05-18 | End: 2023-05-18

## 2023-05-18 RX ORDER — METHYLERGONOVINE MALEATE 0.2 MG/ML
200 INJECTION INTRAVENOUS ONCE AS NEEDED
Status: COMPLETED | OUTPATIENT
Start: 2023-05-18 | End: 2023-05-18

## 2023-05-18 RX ORDER — CARBOPROST TROMETHAMINE 250 UG/ML
250 INJECTION, SOLUTION INTRAMUSCULAR AS NEEDED
Status: DISCONTINUED | OUTPATIENT
Start: 2023-05-18 | End: 2023-05-20 | Stop reason: HOSPADM

## 2023-05-18 RX ORDER — ONDANSETRON 4 MG/1
4 TABLET, FILM COATED ORAL EVERY 8 HOURS PRN
Status: DISCONTINUED | OUTPATIENT
Start: 2023-05-18 | End: 2023-05-20 | Stop reason: HOSPADM

## 2023-05-18 RX ADMIN — CEFAZOLIN SODIUM 2 G: 2 SOLUTION INTRAVENOUS at 14:13

## 2023-05-18 RX ADMIN — MISOPROSTOL 800 MCG: 200 TABLET ORAL at 16:33

## 2023-05-18 RX ADMIN — PHENYLEPHRINE HYDROCHLORIDE 100 MCG: 10 INJECTION INTRAVENOUS at 14:25

## 2023-05-18 RX ADMIN — Medication 30 UNITS: at 17:46

## 2023-05-18 RX ADMIN — FENTANYL CITRATE 20 MCG: 50 INJECTION, SOLUTION INTRAMUSCULAR; INTRAVENOUS at 14:23

## 2023-05-18 RX ADMIN — OXYTOCIN-SODIUM CHLORIDE 0.9% IV SOLN 30 UNIT/500ML 500 ML/HR: 30-0.9/5 SOLUTION at 14:46

## 2023-05-18 RX ADMIN — METHYLERGONOVINE MALEATE 200 MCG: 0.2 INJECTION, SOLUTION INTRAMUSCULAR; INTRAVENOUS at 17:46

## 2023-05-18 RX ADMIN — SODIUM CHLORIDE, POTASSIUM CHLORIDE, SODIUM LACTATE AND CALCIUM CHLORIDE 1000 ML: 600; 310; 30; 20 INJECTION, SOLUTION INTRAVENOUS at 13:13

## 2023-05-18 RX ADMIN — ACETAMINOPHEN 1000 MG: 500 TABLET ORAL at 19:57

## 2023-05-18 RX ADMIN — FAMOTIDINE 20 MG: 10 INJECTION, SOLUTION INTRAVENOUS at 13:40

## 2023-05-18 RX ADMIN — KETOROLAC TROMETHAMINE 30 MG: 30 INJECTION, SOLUTION INTRAMUSCULAR; INTRAVENOUS at 16:13

## 2023-05-18 RX ADMIN — PRENATAL VIT W/ FE FUMARATE-FA TAB 27-0.8 MG 1 TABLET: 27-0.8 TAB at 21:29

## 2023-05-18 RX ADMIN — ACETAMINOPHEN 1000 MG: 500 TABLET ORAL at 13:29

## 2023-05-18 RX ADMIN — ONDANSETRON 4 MG: 2 INJECTION INTRAMUSCULAR; INTRAVENOUS at 20:13

## 2023-05-18 RX ADMIN — OXYCODONE HYDROCHLORIDE 10 MG: 5 TABLET ORAL at 17:45

## 2023-05-18 RX ADMIN — SCOPALAMINE 1 PATCH: 1 PATCH, EXTENDED RELEASE TRANSDERMAL at 13:40

## 2023-05-18 RX ADMIN — SODIUM CHLORIDE, POTASSIUM CHLORIDE, SODIUM LACTATE AND CALCIUM CHLORIDE 125 ML/HR: 600; 310; 30; 20 INJECTION, SOLUTION INTRAVENOUS at 20:28

## 2023-05-18 RX ADMIN — BUPIVACAINE HYDROCHLORIDE 1.5 ML: 7.5 INJECTION, SOLUTION EPIDURAL; RETROBULBAR at 14:23

## 2023-05-18 RX ADMIN — PHENYLEPHRINE HYDROCHLORIDE 100 MCG: 10 INJECTION INTRAVENOUS at 14:40

## 2023-05-18 RX ADMIN — KETOROLAC TROMETHAMINE 15 MG: 30 INJECTION, SOLUTION INTRAMUSCULAR; INTRAVENOUS at 22:41

## 2023-05-18 RX ADMIN — CEFAZOLIN SODIUM 2 G: 2 SOLUTION INTRAVENOUS at 14:26

## 2023-05-18 RX ADMIN — PHENYLEPHRINE HYDROCHLORIDE 100 MCG: 10 INJECTION INTRAVENOUS at 14:50

## 2023-05-18 RX ADMIN — MORPHINE SULFATE 150 MCG: 1 INJECTION, SOLUTION EPIDURAL; INTRATHECAL; INTRAVENOUS at 14:23

## 2023-05-18 RX ADMIN — ONDANSETRON 4 MG: 2 INJECTION INTRAMUSCULAR; INTRAVENOUS at 13:40

## 2023-05-18 RX ADMIN — SODIUM CHLORIDE, POTASSIUM CHLORIDE, SODIUM LACTATE AND CALCIUM CHLORIDE 125 ML/HR: 600; 310; 30; 20 INJECTION, SOLUTION INTRAVENOUS at 14:05

## 2023-05-18 RX ADMIN — SODIUM CHLORIDE 500 MG: 900 INJECTION, SOLUTION INTRAVENOUS at 14:26

## 2023-05-18 RX ADMIN — DOCUSATE SODIUM 100 MG: 100 CAPSULE, LIQUID FILLED ORAL at 21:30

## 2023-05-18 RX ADMIN — PHENYLEPHRINE HYDROCHLORIDE 100 MCG: 10 INJECTION INTRAVENOUS at 14:33

## 2023-05-18 NOTE — ANESTHESIA POSTPROCEDURE EVALUATION
Patient: Tanna Jones    Procedure Summary     Date: 23 Room / Location: [facility] LABOR DELIVERY 1  [facility] LABOR DELIVERY    Anesthesia Start:  Anesthesia Stop:     Procedure:  SECTION PRIMARY (Abdomen) Diagnosis:       Elevated blood pressure affecting pregnancy, antepartum      Maternal care for breech presentation, single gestation      (Elevated blood pressure affecting pregnancy, antepartum [O16.9])      (Maternal care for breech presentation, single gestation [O32.1XX0])    Surgeons: Roberto Alcala MD Provider: Aurelio Mcclure CRNA    Anesthesia Type: spinal ASA Status: 2          Anesthesia Type: spinal    Vitals  Vitals Value Taken Time   /57 23 1545   Temp 97.5 °F (36.4 °C) 23 1539   Pulse 88 23 1545   Resp 19 23 1539   SpO2 100 % 23 1539   Vitals shown include unvalidated device data.        Post Anesthesia Care and Evaluation    Patient location during evaluation: bedside  Patient participation: complete - patient participated  Level of consciousness: awake and alert  Pain score: 0  Pain management: satisfactory to patient    Airway patency: patent  Anesthetic complications: No anesthetic complications  PONV Status: none  Cardiovascular status: acceptable  Respiratory status: acceptable  Hydration status: acceptable

## 2023-05-18 NOTE — ANESTHESIA PREPROCEDURE EVALUATION
Anesthesia Evaluation     Patient summary reviewed and Nursing notes reviewed   no history of anesthetic complications:  NPO Solid Status: > 8 hours  NPO Liquid Status: > 4 hours           Airway   Mallampati: II  TM distance: >3 FB  Neck ROM: full  No difficulty expected  Dental - normal exam     Pulmonary - normal exam    breath sounds clear to auscultation  (+) a smoker (quit 2 y/o) Former,   Cardiovascular - normal exam  Exercise tolerance: good (4-7 METS)    ECG reviewed  Rhythm: regular  Rate: normal    (+) hypertension (gestational),     ROS comment: HEART RATE= 80  bpm  RR Interval= 752  ms  CT Interval= 133  ms  P Horizontal Axis= 8  deg  P Front Axis= 78  deg  QRSD Interval= 86  ms  QT Interval= 367  ms  QRS Axis= 65  deg  T Wave Axis= 35  deg  - NORMAL ECG -  Sinus rhythm  NO SIGNIFICANT CHANGE FROM PREVIOUS ECG  Electronically Signed By: Mike Blair ([facility]) 24-Nov-2021 20:58:48  Date and Time of Study: 2021-11-24 18:46:30    Neuro/Psych  (+) psychiatric history Anxiety,    GI/Hepatic/Renal/Endo - negative ROS     Musculoskeletal (-) negative ROS    Abdominal  - normal exam   Substance History - negative use     OB/GYN    (+) Pregnant, pregnancy induced hypertension        Other - negative ROS                       Anesthesia Plan    ASA 2     spinal     intravenous induction     Anesthetic plan, risks, benefits, and alternatives have been provided, discussed and informed consent has been obtained with: patient.  Pre-procedure education provided  Use of blood products discussed with patient  Consented to blood products.   Plan discussed with CRNA.        CODE STATUS:

## 2023-05-18 NOTE — H&P
" PREOPERATIVE HISTORY AND PHYSICAL      Patient Care Team:  Nandini Phelps MD as PCP - General (Internal Medicine)  Yared Jaime, RN as Nurse Navigator (Obstetrics)  Lupe Townsend, RN as Nurse Navigator (Obstetrics)    Chief complaint: Gestational hypertension without significant proteinuria in third trimester, IUP @ 37w1d, breech    Pt is a 22 y.o.   Patient's last menstrual period was 2022.     HPI:History of Present Illness  Pt has GHTN without proteinuria and is breech .  rec is to delivery by 37 weeks.  Pt will need a C/S      PMHx:   Past Medical History:   Diagnosis Date   • Anemia    • Anxiety    • Cervical dystonia     dx about 1 year ago ()   • Concussion     3/2018, hit left side of head on door   • Gestational hypertension    • Hypertension     resolved       Current problem list:    Gestational hypertension without significant proteinuria in third trimester    Rubella non-immune status, antepartum    Elevated blood pressure affecting pregnancy, antepartum    Breech       PSHx:   Past Surgical History:   Procedure Laterality Date   • HERNIA REPAIR         Social Hx:   Social History     Socioeconomic History   • Marital status:    Tobacco Use   • Smoking status: Former     Types: Electronic Cigarette   • Smokeless tobacco: Former   • Tobacco comments:     \"I stopped vaping yesterday\". States stoppede in Dec   Vaping Use   • Vaping Use: Former   Substance and Sexual Activity   • Alcohol use: No   • Drug use: No   • Sexual activity: Defer       FHx:   Family History   Problem Relation Age of Onset   • No Known Problems Mother    • No Known Problems Father        Debilities/Disabilities Identified: None    Emotional Behavior: Appropriate    PGyn Hx:  otherwise noncontributory    POBHx:   OB History    Para Term  AB Living   1 0 0 0 0 0   SAB IAB Ectopic Molar Multiple Live Births   0 0 0 0 0 0      # Outcome Date GA Lbr Kali/2nd Weight Sex " Delivery Anes PTL Lv   1 Current                Allergies: Patient has no known allergies.    Medications:   No medications prior to admission.                            No current facility-administered medications for this encounter.    Current Outpatient Medications:   •  ferrous gluconate (FERGON) 324 MG tablet, Take 1 tablet by mouth Daily With Breakfast., Disp: 30 tablet, Rfl: 2  •  prenatal vitamin (prenatal, CLASSIC, vitamin) tablet, Take 1 tablet by mouth Daily., Disp: , Rfl:         Review of Systems   Constitutional: Negative.    HENT: Negative.    Eyes: Negative.    Respiratory: Negative.    Cardiovascular: Negative.    Gastrointestinal: Negative.    Endocrine: Negative.    Genitourinary: Negative.    Musculoskeletal: Negative.    Skin: Negative.    Allergic/Immunologic: Negative.    Neurological: Negative.    Hematological: Negative.    Psychiatric/Behavioral: Negative.        Vital Signs  LMP 08/30/2022     Physical Exam  Vitals and nursing note reviewed.   Constitutional:       Appearance: She is well-developed.   HENT:      Head: Normocephalic and atraumatic.   Cardiovascular:      Rate and Rhythm: Normal rate.   Pulmonary:      Effort: Pulmonary effort is normal.   Abdominal:      General: There is no distension.      Palpations: Abdomen is soft. There is no mass.      Tenderness: There is no abdominal tenderness. There is no guarding.   Genitourinary:     Vagina: No vaginal discharge.   Musculoskeletal:         General: No tenderness or deformity. Normal range of motion.      Cervical back: Normal range of motion.   Skin:     General: Skin is warm and dry.      Coloration: Skin is not pale.      Findings: No erythema or rash.   Neurological:      Mental Status: She is alert and oriented to person, place, and time.   Psychiatric:         Behavior: Behavior normal.         Thought Content: Thought content normal.         Judgment: Judgment normal.             IMPRESSION:    Gestational hypertension  without significant proteinuria in third trimester, IUP @ 37w1d, breech presentation                                    PLAN:    Procedure(s):   SECTION PRIMARY    RISKS, ALTERNATIVES, COMPLICATIONS OF THE PROCEDURE INCLUDING BUT NOT LIMITED TO:    INTRAOPERATIVE RISKS: INJURY TO INTERNAL AND ADJACENT ORGANS AND STRUCTURES (BOWEL, BLADDER, URETER,BLOOD VESSELS) OR HEMORRHAGE REQUIRING FURTHER SURGERY (LAPAROTOMY),  POSSIBLE NON-DIAGNOSTIC FINDINGS, DISCOVERY OF POSSIBLE MALIGNANCY, INFECTION, AND DEATH;   POSTOP COMPLICATIONS: BLEEDING, INFECTION (REQUIRING POSSIBLE REOPERATION), FAILURE OF GOAL OF SURGERY AND RECURRENCE OF ORIGINAL SYMPTOMS, PNEUMONIA, PULMONARY EMBOLISM, AND DEATH;  WERE EXPLAINED TO THE PT WHO VERBALIZED HER UNDERSTANDING.             I discussed the patients findings and my recommendations with patient and family.     Roberto Alcala MD  23  20:02 EDT

## 2023-05-18 NOTE — INTERVAL H&P NOTE
"H&P reviewed. The patient was examined and there are no changes to the H&P.    /80   Pulse 81   Temp 98.4 °F (36.9 °C) (Oral)   Resp 18   Ht 152.4 cm (60\")   Wt 81.6 kg (180 lb)   LMP 08/30/2022   BMI 35.15 kg/m²     Medications Prior to Admission   Medication Sig Dispense Refill Last Dose    ferrous gluconate (FERGON) 324 MG tablet Take 1 tablet by mouth Daily With Breakfast. 30 tablet 2     prenatal vitamin (prenatal, CLASSIC, vitamin) tablet Take 1 tablet by mouth Daily.        "

## 2023-05-18 NOTE — L&D DELIVERY NOTE
[facility]    Delivery Note    Patient Name: Tanna Jones  : 2000  MRN: [id number]    Date of Delivery: 2023     Diagnosis     Pre & Post-Delivery:  Intrauterine pregnancy at 37w2d  Labor status:      Gestational hypertension without significant proteinuria in third trimester    Rubella non-immune status, antepartum    Elevated blood pressure affecting pregnancy, antepartum    Breech    Gestational hypertension             Problem List    Transfer to Postpartum     Review the Delivery Report for details.     Delivery     Delivery: , Low Transverse     YOB: 2023    Time of Birth:  Gestational Age 2:44 PM   37w2d     Anesthesia: Spinal     Delivering clinician: Roberto Ingram [place]    Forceps?   No   Vacuum? No    Shoulder dystocia present: No        Delivery narrative:    PROCEDURE: PRIMARY LOW TRANSVERSE  SECTION    PREOP DIAGNOSIS:  IUP @ 37w2d, Gestational hypertension without proteinuria, breech presentation    POSTOP DIAGNOSIS:  same    SURGEON:   Fredrick    ASSIST:  Samantha, responsible for retracting, suturing, delivery of fetus, closing and placing dressing.    ANESTHESIA:  spinal    EBL:   1000cc    IVFS:  1000cc    UO:  100cc    COMPLICATIONS:  none    FINDINGS:  1 live, viable male, Apgars: 8, 9, wt = 5-15 @ 14:44, breech    ANTIBIOTICS:  KEFZOL, ZITHROMAX    SPECIMENS:  placenta        DESCRIPTION OF THE PROCEDURE:      The patient was taken to the OR and placed on the table in the dorsosupine position.  Adequate spinal anesthesia was ensured.     Pt was prepped and draped.  IV antibiotics were given, time out was done.    A Pfannenstiel incision was made with a knife and carried down sharply till the fascia was encountered.  The fascia was scored in the midline and extended bilaterally.  The fascia was then dissected bluntly and sharply off the rectus muscle bellies in a superior and inferior direction. The muscles were divided in the  midline and the preperitoneal tissue was picked up with hemostats, incised, the peritoneal cavity thus being entered.  This opening was extended bluntly.    A low transverse incision was made with a knife without developing the bladder flap and the amniotic cavity was entered.  There was clear amniotic fluid.  This opening was extended bluntly superiorly and inferiorly.    Pt was delivered of 1 live viable male from the breech position by breech extraction.  There was no nuchal cord.  Infant was completely delivered, bulb suctioned, cord doubly clamped and divided and infant handed to nurse in attendance.  Cord blood was drawn, placenta delivered manually, intact w/ 3VC and was sent to pathology.    The uterus was exteriorized out of the abdominal cavity and wiped clean with a lap.  The uterine incision was reapproximated with 0 chromic in a running, interlocking stitch till completely hemostatic.  Any bleeders were bovied or oversewn.  The uterus was returned to the abdominal cavity and it was irrigated with copious amounts of warm water.  The uterine incision was reinspected and found to be completely hemostatic.    Both tubes and ovaries were normal, and the rest of the abdominal cavity was palpably normal.  All instruments were removed. Before each level of closure, copious irrigation was carried out and hemostasis was ensured.     The bladder flap was reapproximated to the uterus with 2-0 chromic.    The urine was clear at the termination of the procedure.     The fascia was closed with 0 vicryl in a running stitch. The subcutaneous layer was closed by the assistant as was the skin.      Pt tolerated procedure well and went to the  in satis condition.  All sponge, instrument and needle counts were correct x 3 according to the operating room personnel.        Infant     Findings: male  infant     Infant observations: Weight: No birth weight on file.   Length:   in  Observations/Comments:        Apgars:   @ 1  minute /      @ 5 minutes   Infant Name:      Placenta & Cord         Placenta delivered  Manual removal  at   5/18/2023  2:45 PM     Cord:   present.   Nuchal Cord?  no   Cord blood obtained:     Cord gases obtained:      Cord gas results: Venous:  No results found for: PHCVEN    Arterial:  No results found for: PHCART     Repair      Episiotomy: Not recorded     No    Lacerations: No   Estimated Blood Loss:       Quantitative Blood Loss:          Complications     hypertension    Disposition     Mother to Mother Baby/Postpartum  in stable condition currently.  Baby to NBN  in stable condition currently.    Roberto Alcala MD  05/18/23  15:21 EDT

## 2023-05-18 NOTE — ANESTHESIA PROCEDURE NOTES
Spinal Block    Pre-sedation assessment completed: 5/18/2023 2:20 PM    Patient reassessed immediately prior to procedure    Start Time: 5/18/2023 2:22 PM  Stop Time: 5/18/2023 2:25 PM  Indication:at surgeon's request and post-op pain management  Performed By  CRNA/CAA: TESS Rabago CRNA  Preanesthetic Checklist  Completed: patient identified, IV checked, site marked, risks and benefits discussed, surgical consent, monitors and equipment checked, pre-op evaluation and timeout performed  Spinal Block Prep:  Patient Position:sitting  Sterile Tech:cap, gloves, mask and sterile barriers  Prep:Chloraprep  Patient Monitoring:blood pressure monitoring, continuous pulse oximetry and EKG    Spinal Block Procedure  Approach:midline  Guidance:landmark technique and palpation technique  Location:L3-L4  Needle Type:Sprotte  Needle Gauge:25 G  Placement of Spinal needle event:cerebrospinal fluid aspirated  Paresthesia: no  Fluid Appearance:clear  Medications: bupivacaine PF (MARCAINE) injection 0.75% - Epidural, Back   1.5 mL - 5/18/2023 2:23:00 PM   Post Assessment  Patient Tolerance:patient tolerated the procedure well with no apparent complications  Complications no

## 2023-05-18 NOTE — OP NOTE
OPERATIVE REPORT    PROCEDURE: PRIMARY LOW TRANSVERSE  SECTION    PREOP DIAGNOSIS:  IUP @ 37w2d, Gestational hypertension without proteinuria, breech presentation    POSTOP DIAGNOSIS:  same    SURGEON:   Fredrick    ASSIST:  Samantha, responsible for retracting, suturing, delivery of fetus, closing and placing dressing.    ANESTHESIA:  spinal    EBL:   1000cc    IVFS:  1000cc    UO:  100cc    COMPLICATIONS:  none    FINDINGS:  1 live, viable male, Apgars: 8, 9, wt = 5-15 @ 14:44, breech    ANTIBIOTICS:  KEFZOL, ZITHROMAX    SPECIMENS:  placenta        DESCRIPTION OF THE PROCEDURE:      The patient was taken to the OR and placed on the table in the dorsosupine position.  Adequate spinal anesthesia was ensured.     Pt was prepped and draped.  IV antibiotics were given, time out was done.    A Pfannenstiel incision was made with a knife and carried down sharply till the fascia was encountered.  The fascia was scored in the midline and extended bilaterally.  The fascia was then dissected bluntly and sharply off the rectus muscle bellies in a superior and inferior direction. The muscles were divided in the midline and the preperitoneal tissue was picked up with hemostats, incised, the peritoneal cavity thus being entered.  This opening was extended bluntly.    A low transverse incision was made with a knife without developing the bladder flap and the amniotic cavity was entered.  There was clear amniotic fluid.  This opening was extended bluntly superiorly and inferiorly.    Pt was delivered of 1 live viable male from the breech position by breech extraction.  There was no nuchal cord.  Infant was completely delivered, bulb suctioned, cord doubly clamped and divided and infant handed to nurse in attendance.  Cord blood was drawn, placenta delivered manually, intact w/ 3VC and was sent to pathology.    The uterus was exteriorized out of the abdominal cavity and wiped clean with a lap.  The uterine incision was  reapproximated with 0 chromic in a running, interlocking stitch till completely hemostatic.  Any bleeders were bovied or oversewn.  The uterus was returned to the abdominal cavity and it was irrigated with copious amounts of warm water.  The uterine incision was reinspected and found to be completely hemostatic.    Both tubes and ovaries were normal, and the rest of the abdominal cavity was palpably normal.  All instruments were removed. Before each level of closure, copious irrigation was carried out and hemostasis was ensured.     The bladder flap was reapproximated to the uterus with 2-0 chromic.    The urine was clear at the termination of the procedure.     The fascia was closed with 0 vicryl in a running stitch. The subcutaneous layer was closed by the assistant as was the skin.      Pt tolerated procedure well and went to the RR in satis condition.  All sponge, instrument and needle counts were correct x 3 according to the operating room personnel.      Roberto Alcala MD  15:16 EDT  05/18/23

## 2023-05-19 LAB
BASOPHILS # BLD AUTO: 0.03 10*3/MM3 (ref 0–0.2)
BASOPHILS NFR BLD AUTO: 0.3 % (ref 0–1.5)
DEPRECATED RDW RBC AUTO: 45.8 FL (ref 37–54)
EOSINOPHIL # BLD AUTO: 0.03 10*3/MM3 (ref 0–0.4)
EOSINOPHIL NFR BLD AUTO: 0.3 % (ref 0.3–6.2)
ERYTHROCYTE [DISTWIDTH] IN BLOOD BY AUTOMATED COUNT: 13.4 % (ref 12.3–15.4)
HCT VFR BLD AUTO: 26 % (ref 34–46.6)
HGB BLD-MCNC: 8.8 G/DL (ref 12–15.9)
IMM GRANULOCYTES # BLD AUTO: 0.06 10*3/MM3 (ref 0–0.05)
IMM GRANULOCYTES NFR BLD AUTO: 0.5 % (ref 0–0.5)
LYMPHOCYTES # BLD AUTO: 1.9 10*3/MM3 (ref 0.7–3.1)
LYMPHOCYTES NFR BLD AUTO: 16.4 % (ref 19.6–45.3)
MCH RBC QN AUTO: 32 PG (ref 26.6–33)
MCHC RBC AUTO-ENTMCNC: 33.8 G/DL (ref 31.5–35.7)
MCV RBC AUTO: 94.5 FL (ref 79–97)
MONOCYTES # BLD AUTO: 0.9 10*3/MM3 (ref 0.1–0.9)
MONOCYTES NFR BLD AUTO: 7.8 % (ref 5–12)
NEUTROPHILS NFR BLD AUTO: 74.7 % (ref 42.7–76)
NEUTROPHILS NFR BLD AUTO: 8.64 10*3/MM3 (ref 1.7–7)
NRBC BLD AUTO-RTO: 0 /100 WBC (ref 0–0.2)
PLATELET # BLD AUTO: 168 10*3/MM3 (ref 140–450)
PMV BLD AUTO: 10.8 FL (ref 6–12)
RBC # BLD AUTO: 2.75 10*6/MM3 (ref 3.77–5.28)
WBC NRBC COR # BLD: 11.56 10*3/MM3 (ref 3.4–10.8)

## 2023-05-19 PROCEDURE — 0503F POSTPARTUM CARE VISIT: CPT | Performed by: NURSE PRACTITIONER

## 2023-05-19 PROCEDURE — 85025 COMPLETE CBC W/AUTO DIFF WBC: CPT | Performed by: OBSTETRICS & GYNECOLOGY

## 2023-05-19 PROCEDURE — 25010000002 KETOROLAC TROMETHAMINE PER 15 MG: Performed by: OBSTETRICS & GYNECOLOGY

## 2023-05-19 RX ADMIN — Medication 324 MG: at 07:39

## 2023-05-19 RX ADMIN — ACETAMINOPHEN 1000 MG: 500 TABLET ORAL at 07:38

## 2023-05-19 RX ADMIN — KETOROLAC TROMETHAMINE 15 MG: 30 INJECTION, SOLUTION INTRAMUSCULAR; INTRAVENOUS at 10:57

## 2023-05-19 RX ADMIN — DOCUSATE SODIUM 100 MG: 100 CAPSULE, LIQUID FILLED ORAL at 20:33

## 2023-05-19 RX ADMIN — KETOROLAC TROMETHAMINE 15 MG: 30 INJECTION, SOLUTION INTRAMUSCULAR; INTRAVENOUS at 05:14

## 2023-05-19 RX ADMIN — ACETAMINOPHEN 1000 MG: 500 TABLET ORAL at 01:16

## 2023-05-19 RX ADMIN — POLYETHYLENE GLYCOL 3350 17 G: 17 POWDER, FOR SOLUTION ORAL at 09:11

## 2023-05-19 RX ADMIN — DOCUSATE SODIUM 100 MG: 100 CAPSULE, LIQUID FILLED ORAL at 09:11

## 2023-05-19 RX ADMIN — KETOROLAC TROMETHAMINE 15 MG: 30 INJECTION, SOLUTION INTRAMUSCULAR; INTRAVENOUS at 17:04

## 2023-05-19 RX ADMIN — ACETAMINOPHEN 1000 MG: 500 TABLET ORAL at 14:05

## 2023-05-19 RX ADMIN — ACETAMINOPHEN 650 MG: 325 TABLET ORAL at 20:33

## 2023-05-19 NOTE — PROGRESS NOTES
[facility]   PROGRESS NOTE     Post-Op Day 1 S/P   Subjective   Subjective  Patient reports:  Pain is well controlled with IV toradol.  She is up and ambulating. Tolerating diet. Tolerating po -- normal.  Intake -- c/o of tolerating po solids and tolerating po liquids.   Voiding - has not voided ; flatus reported..  Vaginal bleeding is as much as expected.    Objective    Objective     Vitals: Vital Signs Range for the last 24 hours  Temperature: Temp:  [97.5 °F (36.4 °C)-98.4 °F (36.9 °C)] 98.2 °F (36.8 °C)   Temp Source: Temp src: Oral   BP: BP: (111-157)/(56-87) 111/56   Pulse: Heart Rate:  [] 82   Respirations: Resp:  [16-24] 16   SPO2: SpO2:  [96 %-100 %] 98 %   O2 Amount (l/min):     O2 Devices Device (Oxygen Therapy): nasal cannula with ETCO2;room air   Weight: Weight:  [81.6 kg (180 lb)] 81.6 kg (180 lb)            Physical Exam    Lungs clear to auscultation bilaterally   Abdomen Soft, fundus firm, bowel sounds present   Incision  Dressing C/D/I    Extremities edema trace and Homans sign is negative, no sign of DVT     I reviewed the patient's new clinical results.    Assessment & Plan         delivery delivered 23    Rubella non-immune status, antepartum    Elevated blood pressure affecting pregnancy, antepartum    Breech    Gestational hypertension without significant proteinuria in third trimester    Gestational hypertension    Other immediate postpartum hemorrhage    Assessment & Plan    Assessment:    Tanna Jones is Day 1  post-partum  , Low Transverse   .      Plan:  1) Postop day #1- S/P PLTCS. Rh +. Hgb 8.8gdL.    2) Postpartum care- Continue. Enc ambulation.     3) Postpartum anemia- Continue iron. Check CBC in AM.     4) Male infant- Bottle. Desires circ.     5) RNI- Offer MMR prior to discharge.    6) GHTN- BP normotensive.     7) Dispo- Consider home tomorrow.       Xenia Greer, ANEESH  23  08:20 EDT

## 2023-05-19 NOTE — PROGRESS NOTES
Patient: Tanna Jones  Procedure(s):   SECTION PRIMARY  Anesthesia type: spinal    Patient location: Labor and Delivery  Last vitals:   Vitals:    23 0759   BP: 111/56   Pulse: 82   Resp: 16   Temp: 98.2 °F (36.8 °C)   SpO2: 98%     Level of consciousness: awake, alert and oriented    Post-anesthesia pain: adequate analgesia  Airway patency: patent  Respiratory: unassisted  Cardiovascular: stable and blood pressure at baseline  Hydration: euvolemic    Anesthetic complications: no

## 2023-05-19 NOTE — PLAN OF CARE
Goal Outcome Evaluation:  Plan of Care Reviewed With: patient        Progress: improving  Outcome Evaluation: Vitals wnl. FF u/u, scant rubra lochia. Low transverse abdominal incision c/d/i with telfa/tegaderm dressing. Pt report passing flatus. Voiding without difficulty. Pt ambulating in room and to bathroom, denies feeling lightheadedness and/or dizziness. Pt pain controlled with scheduled pain medications. Bonding well. Cont with plan of care. Update MD as needed with changes.

## 2023-05-19 NOTE — PLAN OF CARE
Problem: Adult Inpatient Plan of Care  Goal: Plan of Care Review  Outcome: Ongoing, Progressing  Flowsheets (Taken 5/19/2023 0555)  Progress: improving  Plan of Care Reviewed With:   patient   spouse  Outcome Evaluation: vss and pain well controlled with scheduled eras meds.  ff with scant lochia through the night.  dressing dry and intact, is at bedside and scd's on.  pyle dc'd at 0530 after adequate output of 1100.  bonding well with baby.  up x1 with some dizziness.  supportive spouse at bedside.  Goal: Patient-Specific Goal (Individualized)  Outcome: Ongoing, Progressing  Flowsheets (Taken 5/19/2023 0555)  Individualized Care Needs: educate on normal course of pp cs recovery  Goal: Absence of Hospital-Acquired Illness or Injury  Outcome: Ongoing, Progressing  Intervention: Identify and Manage Fall Risk  Description: Perform standard risk assessment on admission using a validated tool or comprehensive approach appropriate to the patient; reassess fall risk frequently, with change in status or transfer to another level of care.  Communicate fall injury risk to interprofessional healthcare team.  Determine need for increased observation, equipment and environmental modification, such as low bed, signage and supportive, nonskid footwear.  Adjust safety measures to individual developmental age, stage and identified risk factors.  Reinforce the importance of safety and physical activity with patient and family.  Perform regular intentional rounding to assess need for position change, pain assessment and personal needs, including assistance with toileting.  Recent Flowsheet Documentation  Taken 5/18/2023 1957 by Sharee Sexton, RN  Safety Promotion/Fall Prevention:   safety round/check completed   room organization consistent   assistive device/personal items within reach   clutter free environment maintained   lighting adjusted  Intervention: Prevent and Manage VTE (Venous Thromboembolism) Risk  Description:  Assess for VTE (venous thromboembolism) risk.  Encourage and assist with early ambulation.  Initiate and maintain compression or other therapy, as indicated, based on identified risk in accordance with organizational protocol and provider order.  Encourage both active and passive leg exercises while in bed, if unable to ambulate.  Recent Flowsheet Documentation  Taken 5/18/2023 1957 by Sharee Sexton RN  Activity Management: bedrest  VTE Prevention/Management:   sequential compression devices on   bilateral  Intervention: Prevent Infection  Description: Maintain skin and mucous membrane integrity; promote hand, oral and pulmonary hygiene.  Optimize fluid balance, nutrition, sleep and glycemic control to maximize infection resistance.  Identify potential sources of infection early to prevent or mitigate progression of infection (e.g., wound, lines, devices).  Evaluate ongoing need for invasive devices; remove promptly when no longer indicated.  Recent Flowsheet Documentation  Taken 5/18/2023 1957 by Sharee Sxeton, RN  Infection Prevention:   cohorting utilized   environmental surveillance performed   equipment surfaces disinfected   hand hygiene promoted   personal protective equipment utilized   rest/sleep promoted   single patient room provided   visitors restricted/screened  Goal: Optimal Comfort and Wellbeing  Outcome: Ongoing, Progressing  Intervention: Provide Person-Centered Care  Description: Use a family-focused approach to care.  Develop trust and rapport by proactively providing information, encouraging questions, addressing concerns and offering reassurance.  Acknowledge emotional response to hospitalization.  Recognize and utilize personal coping strategies.  Honor spiritual and cultural preferences.  Recent Flowsheet Documentation  Taken 5/18/2023 1957 by Sharee Sexton, RN  Trust Relationship/Rapport:   care explained   choices provided   emotional support provided   empathic  listening provided   questions answered   questions encouraged   reassurance provided   thoughts/feelings acknowledged  Goal: Readiness for Transition of Care  Outcome: Ongoing, Progressing     Problem: Adjustment to Role Transition (Postpartum  Delivery)  Goal: Successful Maternal Role Transition  Outcome: Ongoing, Progressing  Intervention: Support Maternal Role Transition  Description: Develop trust, relationship and rapport.  Use a family-focused approach; promote involvement of support system in infant care.  Incorporate cultural beliefs, rituals and practices in family-centered care.  Encourage and support breastfeeding, frequent holding and skin-to-skin contact with .  Encourage attachment behaviors; promote involvement in infant care.  Monitor maternal emotional state, as well as maternal-infant interaction.  Encourage and answer questions; share resources for support following discharge.  Recent Flowsheet Documentation  Taken 2023 by Sharee Sexton RN  Supportive Measures:   active listening utilized   decision-making supported   goal-setting facilitated   relaxation techniques promoted   self-care encouraged   verbalization of feelings encouraged  Parent/Child Attachment Promotion:   caring behavior modeled   cue recognition promoted   face-to-face positioning promoted   interaction encouraged   parent/caregiver presence encouraged   participation in care promoted   positive reinforcement provided   rooming-in promoted   skin-to-skin contact encouraged   strengths emphasized     Problem: Bleeding (Postpartum  Delivery)  Goal: Hemostasis  Outcome: Ongoing, Progressing     Problem: Infection (Postpartum  Delivery)  Goal: Absence of Infection Signs and Symptoms  Outcome: Ongoing, Progressing     Problem: Pain (Postpartum  Delivery)  Goal: Acceptable Pain Control  Outcome: Ongoing, Progressing     Problem: Postoperative Nausea and Vomiting (Postpartum   Delivery)  Goal: Nausea and Vomiting Relief  Outcome: Ongoing, Progressing     Problem: Postoperative Urinary Retention (Postpartum  Delivery)  Goal: Effective Urinary Elimination  Outcome: Ongoing, Progressing     Problem: Skin Injury Risk Increased  Goal: Skin Health and Integrity  Outcome: Ongoing, Progressing   Goal Outcome Evaluation:  Plan of Care Reviewed With: patient, spouse        Progress: improving  Outcome Evaluation: vss and pain well controlled with scheduled eras meds.  ff with scant lochia through the night.  dressing dry and intact, is at bedside and scd's on.  pyle dc'd at 0530 after adequate output of 1100.  bonding well with baby.  up x1 with some dizziness.  supportive spouse at bedside.

## 2023-05-20 VITALS
HEIGHT: 60 IN | SYSTOLIC BLOOD PRESSURE: 121 MMHG | WEIGHT: 180 LBS | BODY MASS INDEX: 35.34 KG/M2 | DIASTOLIC BLOOD PRESSURE: 57 MMHG | HEART RATE: 89 BPM | TEMPERATURE: 98.7 F | RESPIRATION RATE: 18 BRPM | OXYGEN SATURATION: 98 %

## 2023-05-20 LAB
HCT VFR BLD AUTO: 24.1 % (ref 34–46.6)
HGB BLD-MCNC: 7.8 G/DL (ref 12–15.9)

## 2023-05-20 PROCEDURE — 25010000002 MEASLES, MUMPS & RUBELLA VAC RECONSTITUTED SOLUTION: Performed by: OBSTETRICS & GYNECOLOGY

## 2023-05-20 PROCEDURE — 85014 HEMATOCRIT: CPT | Performed by: OBSTETRICS & GYNECOLOGY

## 2023-05-20 PROCEDURE — 0503F POSTPARTUM CARE VISIT: CPT | Performed by: OBSTETRICS & GYNECOLOGY

## 2023-05-20 PROCEDURE — 90471 IMMUNIZATION ADMIN: CPT | Performed by: OBSTETRICS & GYNECOLOGY

## 2023-05-20 PROCEDURE — 85018 HEMOGLOBIN: CPT | Performed by: OBSTETRICS & GYNECOLOGY

## 2023-05-20 PROCEDURE — 90707 MMR VACCINE SC: CPT | Performed by: OBSTETRICS & GYNECOLOGY

## 2023-05-20 RX ORDER — PSEUDOEPHEDRINE HCL 30 MG
100 TABLET ORAL 2 TIMES DAILY PRN
Qty: 60 EACH | Refills: 1 | Status: SHIPPED | OUTPATIENT
Start: 2023-05-20

## 2023-05-20 RX ORDER — OXYCODONE HYDROCHLORIDE AND ACETAMINOPHEN 5; 325 MG/1; MG/1
1 TABLET ORAL EVERY 6 HOURS PRN
Qty: 30 TABLET | Refills: 0 | Status: SHIPPED | OUTPATIENT
Start: 2023-05-20

## 2023-05-20 RX ORDER — IBUPROFEN 600 MG/1
600 TABLET ORAL EVERY 6 HOURS PRN
Qty: 30 TABLET | Refills: 0 | Status: SHIPPED | OUTPATIENT
Start: 2023-05-20

## 2023-05-20 RX ADMIN — IBUPROFEN 600 MG: 600 TABLET, FILM COATED ORAL at 00:41

## 2023-05-20 RX ADMIN — Medication 324 MG: at 07:35

## 2023-05-20 RX ADMIN — DOCUSATE SODIUM 100 MG: 100 CAPSULE, LIQUID FILLED ORAL at 07:35

## 2023-05-20 RX ADMIN — MEASLES, MUMPS, AND RUBELLA VIRUS VACCINE LIVE 0.5 ML: 1000; 12500; 1000 INJECTION, POWDER, LYOPHILIZED, FOR SUSPENSION SUBCUTANEOUS at 10:39

## 2023-05-20 RX ADMIN — IBUPROFEN 600 MG: 600 TABLET, FILM COATED ORAL at 06:10

## 2023-05-20 RX ADMIN — PRENATAL VIT W/ FE FUMARATE-FA TAB 27-0.8 MG 1 TABLET: 27-0.8 TAB at 00:41

## 2023-05-20 RX ADMIN — ACETAMINOPHEN 650 MG: 325 TABLET ORAL at 07:35

## 2023-05-20 RX ADMIN — POLYETHYLENE GLYCOL 3350 17 G: 17 POWDER, FOR SOLUTION ORAL at 07:35

## 2023-05-20 NOTE — NURSING NOTE
Review d/c instructions with pt. Pt instructed to schedule 1 week f/u appt with OB office as soon as possible. Pt verbalized understanding of d/c instructions.

## 2023-05-20 NOTE — PLAN OF CARE
Goal Outcome Evaluation:  Plan of Care Reviewed With: patient        Progress: improving  Outcome Evaluation: discharge to home

## 2023-05-20 NOTE — DISCHARGE SUMMARY
Obstetrical Discharge Form    Primary OB Clinician: CAMILO      Preadmission Diagnosis:  1. Tanna Jones is a 22 y.o.  with IUP @ 37w2d   2. GHTN  3. Breech  4. RNI  5. Iron deficiency anemia    Discharge Diagnosis:  Same, plus:   6. S/P 1 LTCS    Antepartum complications: pregnancy-induced hypertension    Date of Delivery:  2023     Delivered By: Roberto Ingram [place]     Delivery Type: primary  section, low transverse incision    Tubal Ligation: n/a    Baby: Liveborn female, Apgars 8/9, weight 5 #, 15 oz,   95.24  oz    Anesthesia: spinal    Intrapartum complications: None    Laceration: n/a    Feeding method: both breast and bottle -     Hospital Course: Tanna Jones is a 22 y.o.   who presented with an IUP 37w2d for a 1 LTCS for breech presentation and worsening GHTN. Her operative and postpartum course were uncomplicated. She had an admission HgB of 10.7which fell to 7.8 by POD # 2 but she tolerated this well and denies dizziness or SOA. She has iron at home and has been advised to continue. Her blood pressures have been normal off all antihypertensives. She desires to go home today and meets criteria for discharge. They desire a circ for their infant and R/B/A were discussed and all questions answered. She has been advised to monitor for s/sx of pp preeclampsia and we will see her in 1-2 weeks for a BP check.      Discharge Date: 2023; Discharge Time: 09:41 EDT    Review of Systems   Constitutional: Positive for activity change and fatigue.   Eyes: Negative for visual disturbance.   Cardiovascular: Negative for leg swelling.   Gastrointestinal: Negative for abdominal pain (incisional pain only).   Genitourinary: Positive for vaginal bleeding. Negative for pelvic pain, vaginal discharge and vaginal pain.   Musculoskeletal: Negative for back pain.   Neurological: Negative for headaches.   Psychiatric/Behavioral: Positive for sleep disturbance. Negative for decreased concentration  "and dysphoric mood. The patient is not nervous/anxious.      /57 (BP Location: Left arm, Patient Position: Sitting)   Pulse 89   Temp 98.7 °F (37.1 °C) (Oral)   Resp 18   Ht 152.4 cm (60\")   Wt 81.6 kg (180 lb)   LMP 08/30/2022   SpO2 98%   Breastfeeding Unknown   BMI 35.15 kg/m²      Physical Exam  Vitals and nursing note reviewed. Exam conducted with a chaperone present.   Constitutional:       Appearance: Normal appearance. She is well-developed.   HENT:      Head: Normocephalic and atraumatic.   Eyes:      General: No scleral icterus.     Conjunctiva/sclera: Conjunctivae normal.   Neck:      Thyroid: No thyromegaly.   Abdominal:      General: There is no distension.      Palpations: Abdomen is soft. There is no mass.      Tenderness: There is no abdominal tenderness. There is no guarding or rebound.      Hernia: No hernia is present.      Comments: Inc C/D/I TGegaderm   Musculoskeletal:      Right lower leg: No edema.      Left lower leg: No edema.   Skin:     General: Skin is warm and dry.   Neurological:      Mental Status: She is alert and oriented to person, place, and time.   Psychiatric:         Mood and Affect: Mood normal.         Behavior: Behavior normal.         Thought Content: Thought content normal.         Judgment: Judgment normal.         Results from last 7 days   Lab Units 05/20/23  0614   HEMOGLOBIN g/dL 7.8*     Infant: male  Feeding:both  Rh status: positive, Rhogam was not indicated  Rubella: Not immune  Diabetes: no  Contraception: undecided        Plan:    Patient given written instruction sheet.  Follow-up appointment with [facility] in 1-2 weeks.    Discharge time was less than 30 minutes.     Julisa Mcdaniels MD  09:41 EDT  5/20/2023    "

## 2023-05-20 NOTE — PLAN OF CARE
Goal Outcome Evaluation:  Plan of Care Reviewed With: patient           Outcome Evaluation: VSS, up ad zuleika, pain well controlled with scheduled meds, plans for d/c home later today

## 2023-05-20 NOTE — CASE MANAGEMENT/SOCIAL WORK
Case Management Discharge Note      Final Note: Discharged home.         Selected Continued Care - Discharged on 5/20/2023 Admission date: 5/18/2023 - Discharge disposition: Home or Self Care    Destination    No services have been selected for the patient.              Durable Medical Equipment    No services have been selected for the patient.              Dialysis/Infusion    No services have been selected for the patient.              Home Medical Care    No services have been selected for the patient.              Therapy    No services have been selected for the patient.              Community Resources    No services have been selected for the patient.              Community & DME    No services have been selected for the patient.                Selected Continued Care - Episodes Includes continued care and service providers with selected services from the active episodes listed below    Motherhood Connection Episode start date: 2/13/2023   There are no active outsourced providers for this episode.                    Final Discharge Disposition Code: 01 - home or self-care

## 2023-05-24 LAB
LAB AP CASE REPORT: NORMAL
PATH REPORT.FINAL DX SPEC: NORMAL

## 2023-05-26 NOTE — PROGRESS NOTES
"Enter Query Response Below      Query Response: acute blood loss anemia on chronic iron deficiency anemia             If applicable, please update the problem list.   Patient: Tanna Jones        : 2000  Account: [id number]           Admit Date: 2023        How to Respond to this query:       a. Click New Note     b. Answer query within the yellow box.                c. Update the Problem List, if applicable.    Dr. Alcala,     Discharge Summary with preadmission and discharge diagnosis of \"Iron deficiency anemia.\" Also noted, \"Her operative and postpartum course were uncomplicated. She had an admission HgB of 10.7 which fell to 7.8 by POD # 2 but she tolerated this well and denies dizziness or SOA. She has iron at home and has been advised to continue. Her blood pressures have been normal off all antihypertensives. Review of Systems: Constitutional: Positive for activity change and fatigue.\"    Based on the above, please clarify the diagnosis treated and/or monitored:    Post-partum hemorrhage due to (please specify): ___________ and clinically supported with additional clinical indicators (please specify): ______________  -Acute blood loss anemia on chronic iron deficiency anemia   -Anemia complicating childbirth due to iron deficiency anemia  -Postpartum anemia due to iron deficiency anemia   -Other, please specify: _________  -Unable to determine     By submitting this query, we are merely seeking further clarification of documentation to accurately reflect all conditions that you are monitoring, evaluating, treating or that extend the hospitalization or utilize additional resources of care. Please utilize your independent clinical judgment when addressing the question(s) above.     This query and your response, once completed, will be entered into the legal medical record.    Sincerely,  Belen Mckenna RN, BSN  [email or web address]  Clinical Documentation Integrity Program   "

## 2023-05-28 NOTE — PROGRESS NOTES
"2 WEEK POSTOP C/S NOTE    S:  Doing well, no complaints.  Denies pp depression    O:  /86   Ht 152.4 cm (60\")   LMP 08/30/2022   Breastfeeding No   BMI 35.15 kg/m²       Inc looks normal.     A:  Doing well pp    P:  RTO 4 weeks.     Roberto Alcala MD  13:32 EDT  05/30/23      "

## 2023-05-30 ENCOUNTER — POSTPARTUM VISIT (OUTPATIENT)
Dept: OBSTETRICS AND GYNECOLOGY | Facility: CLINIC | Age: 23
End: 2023-05-30

## 2023-05-30 VITALS — DIASTOLIC BLOOD PRESSURE: 86 MMHG | BODY MASS INDEX: 35.15 KG/M2 | HEIGHT: 60 IN | SYSTOLIC BLOOD PRESSURE: 124 MMHG

## 2023-05-30 DIAGNOSIS — Z13.89 SCREENING FOR GENITOURINARY CONDITION: ICD-10-CM

## 2023-05-30 DIAGNOSIS — O16.9 ELEVATED BLOOD PRESSURE AFFECTING PREGNANCY, ANTEPARTUM: ICD-10-CM

## 2023-05-30 LAB
BILIRUB BLD-MCNC: NEGATIVE MG/DL
CLARITY, POC: CLEAR
COLOR UR: YELLOW
GLUCOSE UR STRIP-MCNC: NEGATIVE MG/DL
KETONES UR QL: NEGATIVE
LEUKOCYTE EST, POC: NEGATIVE
NITRITE UR-MCNC: NEGATIVE MG/ML
PH UR: 5 [PH] (ref 5–8)
PROT UR STRIP-MCNC: NEGATIVE MG/DL
RBC # UR STRIP: ABNORMAL /UL
SP GR UR: 1 (ref 1–1.03)
UROBILINOGEN UR QL: NORMAL

## 2023-06-07 ENCOUNTER — PATIENT OUTREACH (OUTPATIENT)
Dept: LABOR AND DELIVERY | Facility: HOSPITAL | Age: 23
End: 2023-06-07
Payer: COMMERCIAL

## 2023-06-07 NOTE — OUTREACH NOTE
Motherhood Connection  Postpartum Check-In    Questions/Answers      Flowsheet Row Responses   Visit Setting Telephone   Best Method for Contacting Cell   OB Discharge Note Reviewed  Reviewed   OB Discharge Navigator Reviewed  Reviewed   OB Discharge Medications Reviewed  Reviewed    discharged home with mother? Yes   Current Pain Levels 0-10 0   At Rest Pain Levels 0-10 0   Pain level with activity 0-10 0   Acceptable Pain Level 0-10 0   Verbalized Emotional State Acceptance   Family/Support Network Family, Significant Other   Level of Involvement in Care Attentive, Interactive, Supportive   Do you feel comfortable in your relationship with your baby? Yes   Have members of your household adjusted to your baby? Yes   Is the baby's father supportive and/or involved with the baby? Yes   How does your partner feel about the baby? Happy, Involved   Do you feel safe at home, school and work? Yes   Are you in a relationship with someone who threatens you or hurts you? No   Do you have the resources to keep yourself and your baby healthy and safe? Yes   Lochia (per patient report) Brown-diamond Red   Amount Scant   Number of pads per day 2   Lochia Odor None   Is patient breastfeeding? No   How is breast suppression going? good   Postpartum Depression Screening Education Education Provided   Doctor Appointments: Education Provided   Breastfeeding Education Education Provided   Postpartum Care Education Education Provided   S & S to report Education Provided   Followup Appointments Made Yes   Well Child Visit Appointments Made Yes   Appointment Date 23   Provider/Agency [facility]   Well Child Checkup Provider Name CARMEN   Well Child Check Up Date: 23   Did you complete the visit? Yes   Were there any specific concerns? No   Umbilical Cord No reported signs or symptoms   Was the baby circumcised? Yes   Circumcision care and signs/symptoms to report Reviewed   Feeding Readiness Cues: Eager   Infant Feeding  Method Formula   Is a lactation referral indicated? No   Formula Type Other   Other Formula Similac   Formula PO (mL) 3oz   Formula/Expressed Milk frequency of feedings: q4h   Number of wet diapers x 24 hours 7   Last BM x 24 hours 0-1   What safe sleep surface is available? Bassinet   Are there stuffed animals, toys, pillows, quilts, blankets, wedges, positioners, bumpers or other loose bedding in the infant's sleeping environment? No   Where does the baby usually sleep? Bassinet   Does the baby ever share a sleep surface with a sibling, adult or pet? No   Does the baby ever share a sleep surface in a bed, couch, recliner or other? No   What position do you place your baby to sleep for naps? Back   What position do you place your baby to sleep at night Back   Are you and/or other caregivers smoking inside or outside the baby's home? No   Is the infant dressed appropiately for the temperature of the home? Yes   Do you use a clean, dry pacifier that is not attached to a string or stuffed animal? Yes            Review of Systems    Most Recent [place]  Depression Scale Score (EPDS)    Performed by a clinician: 4 (2023  9:52 AM)    Received via [employer] questionnaire:  ()     Pt doing well. Delivered at 37wks, IP check in missed.  C/S for breech.  Using formula, on WIC.  Has had OB and Peds check ups, no concerns.  Next OB check is .    Send grad letter today. Reviewed PP warning s/s.    Sending to call center.    Yared Arndt RN  Maternity Nurse Navigator    2023, 09:58 EDT

## 2023-06-14 ENCOUNTER — PATIENT OUTREACH (OUTPATIENT)
Dept: CALL CENTER | Facility: HOSPITAL | Age: 23
End: 2023-06-14
Payer: COMMERCIAL

## 2023-06-14 NOTE — OUTREACH NOTE
Motherhood Connection Survey      Flowsheet Row Responses   [facility] discharged from? [place]   Week 1 attempt successful? No   Unsuccessful attempts Attempt 1   Reschedule Today              Bela PRIDE - Registered Nurse

## 2023-06-14 NOTE — OUTREACH NOTE
Motherhood Connection Survey      Flowsheet Row Responses   [facility] patient discharged from? [place]   Week 1 attempt successful? Yes   Call start time 1530   Call end time 153   Baby sex Boy    discharged home with mother? Yes   Baby sex Boy   Delivery type    Emotional state Acceptance   Family support Yes   Do you have all necessary resources to care for you and your baby?  Yes   Have members of your household adjusted to your baby? Yes   Did you have any problems with pre-eclampsia during this pregnancy? No   Did you have blood glucose issues during this pregnancy No   Lochia amount Light   Lochia per patient report Rubra   Did you have an episiotomy/tear/abdominal incision? Yes   Feeding Method Bottle   Frequency q 3-4 hrs   Amount 3 oz   Number of wet diapers x 24 hours 6-7   Last BM x 24 hours 2   Umbilical Cord No reported signs or symptoms   Umbilical cord comments cord off   Was the baby circumcised? Yes   Circumcision care and signs/symptoms to report Reviewed   Circumcision comments healed   Where does the baby usually sleep? Bassinet   Are there stuffed animals, toys, pillows, quilts, blankets, wedges, positioners, bumpers or other loose bedding in the infant's sleeping environment? No   Does the baby ever share a sleep surface in a bed, couch, recliner or other? No   What position do you lay your baby down to sleep? Back   Are you and/or other caregivers smoking inside or outside the baby's home? No   Mom appointment comments: pp f/u done   Baby appointment comments: Peds visits done, gaining weight   Call completed? Yes   How satisfied were you with the Motherhood Connection Program? 5              Bela PRIDE - Registered Nurse

## 2023-06-27 PROBLEM — O13.3 GESTATIONAL HYPERTENSION WITHOUT SIGNIFICANT PROTEINURIA IN THIRD TRIMESTER: Status: RESOLVED | Noted: 2023-05-17 | Resolved: 2023-06-27

## 2023-07-31 ENCOUNTER — TELEPHONE (OUTPATIENT)
Dept: OBSTETRICS AND GYNECOLOGY | Facility: CLINIC | Age: 23
End: 2023-07-31
Payer: COMMERCIAL

## 2023-08-02 RX ORDER — NORETHINDRONE ACETATE AND ETHINYL ESTRADIOL 1MG-20(21)
1 KIT ORAL DAILY
Qty: 28 TABLET | Refills: 12 | Status: SHIPPED | OUTPATIENT
Start: 2023-08-02 | End: 2024-08-01

## 2024-04-24 ENCOUNTER — APPOINTMENT (OUTPATIENT)
Dept: GENERAL RADIOLOGY | Facility: HOSPITAL | Age: 24
End: 2024-04-24
Payer: COMMERCIAL

## 2024-04-24 ENCOUNTER — HOSPITAL ENCOUNTER (EMERGENCY)
Facility: HOSPITAL | Age: 24
Discharge: HOME | End: 2024-04-24
Attending: EMERGENCY MEDICINE
Payer: COMMERCIAL

## 2024-04-24 VITALS
TEMPERATURE: 98.1 F | OXYGEN SATURATION: 99 % | DIASTOLIC BLOOD PRESSURE: 73 MMHG | HEIGHT: 60 IN | SYSTOLIC BLOOD PRESSURE: 125 MMHG | BODY MASS INDEX: 23.56 KG/M2 | WEIGHT: 120 LBS | RESPIRATION RATE: 15 BRPM | HEART RATE: 56 BPM

## 2024-04-24 DIAGNOSIS — R09.1 PLEURISY: Primary | ICD-10-CM

## 2024-04-24 LAB
ALBUMIN SERPL-MCNC: 4.4 G/DL (ref 3.5–5.2)
ALBUMIN/GLOB SERPL: 1.8 G/DL
ALP SERPL-CCNC: 88 U/L (ref 39–117)
ALT SERPL W P-5'-P-CCNC: 10 U/L (ref 1–33)
ANION GAP SERPL CALCULATED.3IONS-SCNC: 10.1 MMOL/L (ref 5–15)
AST SERPL-CCNC: 13 U/L (ref 1–32)
BASOPHILS # BLD AUTO: 0.03 10*3/MM3 (ref 0–0.2)
BASOPHILS NFR BLD AUTO: 0.4 % (ref 0–1.5)
BILIRUB SERPL-MCNC: 0.3 MG/DL (ref 0–1.2)
BUN SERPL-MCNC: 17 MG/DL (ref 6–20)
BUN/CREAT SERPL: 16.8 (ref 7–25)
CALCIUM SPEC-SCNC: 9.7 MG/DL (ref 8.6–10.5)
CHLORIDE SERPL-SCNC: 104 MMOL/L (ref 98–107)
CO2 SERPL-SCNC: 26.9 MMOL/L (ref 22–29)
CREAT SERPL-MCNC: 1.01 MG/DL (ref 0.57–1)
D DIMER PPP FEU-MCNC: 0.32 MCGFEU/ML (ref 0–0.5)
DEPRECATED RDW RBC AUTO: 42.7 FL (ref 37–54)
EGFRCR SERPLBLD CKD-EPI 2021: 80.4 ML/MIN/1.73
EOSINOPHIL # BLD AUTO: 0.05 10*3/MM3 (ref 0–0.4)
EOSINOPHIL NFR BLD AUTO: 0.7 % (ref 0.3–6.2)
ERYTHROCYTE [DISTWIDTH] IN BLOOD BY AUTOMATED COUNT: 12.7 % (ref 12.3–15.4)
GLOBULIN UR ELPH-MCNC: 2.4 GM/DL
GLUCOSE SERPL-MCNC: 92 MG/DL (ref 65–99)
HCT VFR BLD AUTO: 37.3 % (ref 34–46.6)
HGB BLD-MCNC: 12.1 G/DL (ref 12–15.9)
IMM GRANULOCYTES # BLD AUTO: 0.01 10*3/MM3 (ref 0–0.05)
IMM GRANULOCYTES NFR BLD AUTO: 0.1 % (ref 0–0.5)
LYMPHOCYTES # BLD AUTO: 2.09 10*3/MM3 (ref 0.7–3.1)
LYMPHOCYTES NFR BLD AUTO: 30.2 % (ref 19.6–45.3)
MCH RBC QN AUTO: 29.5 PG (ref 26.6–33)
MCHC RBC AUTO-ENTMCNC: 32.4 G/DL (ref 31.5–35.7)
MCV RBC AUTO: 91 FL (ref 79–97)
MONOCYTES # BLD AUTO: 0.45 10*3/MM3 (ref 0.1–0.9)
MONOCYTES NFR BLD AUTO: 6.5 % (ref 5–12)
NEUTROPHILS NFR BLD AUTO: 4.28 10*3/MM3 (ref 1.7–7)
NEUTROPHILS NFR BLD AUTO: 62.1 % (ref 42.7–76)
PLATELET # BLD AUTO: 225 10*3/MM3 (ref 140–450)
PMV BLD AUTO: 10.7 FL (ref 6–12)
POTASSIUM SERPL-SCNC: 4.3 MMOL/L (ref 3.5–5.2)
PROT SERPL-MCNC: 6.8 G/DL (ref 6–8.5)
RBC # BLD AUTO: 4.1 10*6/MM3 (ref 3.77–5.28)
SODIUM SERPL-SCNC: 141 MMOL/L (ref 136–145)
TROPONIN T SERPL HS-MCNC: <6 NG/L
WBC NRBC COR # BLD AUTO: 6.91 10*3/MM3 (ref 3.4–10.8)

## 2024-04-24 PROCEDURE — 85379 FIBRIN DEGRADATION QUANT: CPT | Performed by: EMERGENCY MEDICINE

## 2024-04-24 PROCEDURE — 85025 COMPLETE CBC W/AUTO DIFF WBC: CPT | Performed by: EMERGENCY MEDICINE

## 2024-04-24 PROCEDURE — 99284 EMERGENCY DEPT VISIT MOD MDM: CPT

## 2024-04-24 PROCEDURE — 71046 X-RAY EXAM CHEST 2 VIEWS: CPT

## 2024-04-24 PROCEDURE — 96372 THER/PROPH/DIAG INJ SC/IM: CPT

## 2024-04-24 PROCEDURE — 84484 ASSAY OF TROPONIN QUANT: CPT | Performed by: EMERGENCY MEDICINE

## 2024-04-24 PROCEDURE — 80053 COMPREHEN METABOLIC PANEL: CPT | Performed by: EMERGENCY MEDICINE

## 2024-04-24 PROCEDURE — 93005 ELECTROCARDIOGRAM TRACING: CPT | Performed by: EMERGENCY MEDICINE

## 2024-04-24 PROCEDURE — 93005 ELECTROCARDIOGRAM TRACING: CPT

## 2024-04-24 PROCEDURE — 36415 COLL VENOUS BLD VENIPUNCTURE: CPT

## 2024-04-24 PROCEDURE — 25010000002 KETOROLAC TROMETHAMINE PER 15 MG: Performed by: EMERGENCY MEDICINE

## 2024-04-24 PROCEDURE — 93010 ELECTROCARDIOGRAM REPORT: CPT | Performed by: INTERNAL MEDICINE

## 2024-04-24 RX ORDER — KETOROLAC TROMETHAMINE 30 MG/ML
30 INJECTION, SOLUTION INTRAMUSCULAR; INTRAVENOUS ONCE
Status: COMPLETED | OUTPATIENT
Start: 2024-04-24 | End: 2024-04-24

## 2024-04-24 RX ORDER — KETOROLAC TROMETHAMINE 10 MG/1
10 TABLET, FILM COATED ORAL EVERY 6 HOURS PRN
Qty: 20 TABLET | Refills: 0 | Status: SHIPPED | OUTPATIENT
Start: 2024-04-24

## 2024-04-24 RX ADMIN — KETOROLAC TROMETHAMINE 30 MG: 30 INJECTION, SOLUTION INTRAMUSCULAR; INTRAVENOUS at 15:43

## 2024-04-24 NOTE — ED PROVIDER NOTES
Subjective   History of Present Illness    Chief complaint: Chest pain    Location: Left side    Quality/Severity: Pleuritic, moderate    Timing/Onset/Duration: Last night    Modifying Factors: Hurts to take a deep    Associated Symptoms: No headache.  No fever chills or cough.  No sore throat earache or nasal congestion.  No abdominal pain.  No diarrhea or burning when she urinates.  No diaphoresis.    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  Patient always has shoulder and arm pain from her job.  Presents complaint of left-sided chest pain that started last night.  Worse with deep breath.  Patient vapes.  She stopped vaping yesterday.  Patient denies illicit drugs.  She has had no increase in her stress.  Patient has no history of heart disease.    PCP:Nandini Phelps MD      Review of Systems   Constitutional:  Negative for chills, diaphoresis and fever.   HENT:  Negative for congestion, ear pain and sore throat.    Respiratory:  Negative for cough and shortness of breath.    Cardiovascular:  Positive for chest pain. Negative for palpitations.   Gastrointestinal:  Negative for abdominal pain, diarrhea, nausea and vomiting.   Genitourinary:  Negative for dysuria.   Skin:  Negative for rash.   Neurological:  Negative for headaches.         Past Medical History:   Diagnosis Date    Anemia     Anxiety     Cervical dystonia     dx about 1 year ago ()    Concussion     3/2018, hit left side of head on door    Gestational hypertension     Hypertension     resolved       No Known Allergies    Past Surgical History:   Procedure Laterality Date     SECTION N/A 2023    Procedure:  SECTION PRIMARY;  Surgeon: Roberto Alcala MD;  Location: [facility] LABOR DELIVERY;  Service: Obstetrics/Gynecology;  Laterality: N/A;    HERNIA REPAIR         Family History   Problem Relation Age of Onset    No Known Problems Mother     No Known Problems Father        Social  "History     Socioeconomic History    Marital status:    Tobacco Use    Smoking status: Former     Types: Electronic Cigarette    Smokeless tobacco: Former    Tobacco comments:     \"I stopped vaping yesterday\". States stoppede in Dec   Vaping Use    Vaping status: Every Day    Substances: Nicotine    Devices: Disposable   Substance and Sexual Activity    Alcohol use: No    Drug use: No    Sexual activity: Defer           Objective   Physical Exam  Vitals (The temperature is 98.1 °F, pulse 83, respirations 16, /82, room air pulse ox 98%.) reviewed.   Constitutional:       Appearance: She is well-developed.   HENT:      Head: Normocephalic and atraumatic.   Cardiovascular:      Rate and Rhythm: Normal rate and regular rhythm.      Heart sounds: No murmur heard.     No friction rub. No gallop.   Pulmonary:      Effort: Pulmonary effort is normal.      Breath sounds: Normal breath sounds.   Musculoskeletal:         General: Normal range of motion.      Right lower leg: No edema.      Left lower leg: No edema.   Skin:     General: Skin is warm and dry.      Findings: No rash.   Neurological:      General: No focal deficit present.      Mental Status: She is alert and oriented to person, place, and time.         Procedures           ED Course  ED Course as of 04/24/24 1617   Wed Apr 24, 2024   1615 The high-sensitivity troponin is less than 6 and normal [RC]   1615 The CBC is unremarkable [RC]   1615 The serum creatinine is 1.01, GFR is 80, the CMP is otherwise unremarkable. [RC]   1615 The D-dimer is 0.32 normal.   [RC]      ED Course User Index  [RC] Roly Cabello MD      15:18 EDT, 04/24/24:  EKG was initially viewed by Dr. Riggins.  It was further reviewed by me at 1518 showing a normal sinus rhythm with rate of 79.  There is a normal axis with no hypertrophy.  The SD, QRS, and QT intervals are unremarkable.  There is no ectopy.  There is no acute ST elevation or depression.                         "   16:28 EDT, 04/24/24:  The patient was reassessed.  She feels better.  Her vital signs reviewed and are stable    16:28 EDT, 04/24/24:  The patient's diagnosis of pleurisy was discussed with her.  Patient will be given a prescription for ketorolac.  He should not vape or smoke.  She should call Dr. Nandini Phelps in the morning for follow-up appointment within 1 week.  Return to emergency department there is worsening pain, shortness of breath, worse in any way at all.  All the patient question answered she will be discharged in good condition            Medical Decision Making  Amount and/or Complexity of Data Reviewed  ECG/medicine tests: ordered.        Final diagnoses:   Pleurisy       ED Disposition  ED Disposition       None            No follow-up provider specified.       Medication List      No changes were made to your prescriptions during this visit.            Roly Cabello MD  04/25/24 0001

## 2024-04-24 NOTE — DISCHARGE INSTRUCTIONS
Take the ketorolac as needed as directed.  Call Nandini Phelps in the morning for follow-up appointment within 1 week.  Do not smoke or vape.  Return to emergency department there is worsening pain, shortness of breath, worse in any way at all.

## 2024-04-25 LAB
QT INTERVAL: 359 MS
QTC INTERVAL: 413 MS

## 2024-07-24 ENCOUNTER — TELEPHONE (OUTPATIENT)
Dept: OBSTETRICS AND GYNECOLOGY | Facility: CLINIC | Age: 24
End: 2024-07-24

## 2024-07-24 NOTE — TELEPHONE ENCOUNTER
Pt called in stating that she does not take placebo pills on her bc and is asking how she can get it filled early without having to  pay and her ins cover it.  Thanks.

## 2024-07-29 RX ORDER — NORETHINDRONE ACETATE AND ETHINYL ESTRADIOL 1MG-20(21)
KIT ORAL
Qty: 84 TABLET | Refills: 5 | Status: SHIPPED | OUTPATIENT
Start: 2024-07-29

## 2024-09-30 RX ORDER — NORETHINDRONE ACETATE AND ETHINYL ESTRADIOL 1MG-20(21)
KIT ORAL
Qty: 84 TABLET | Refills: 5 | Status: SHIPPED | OUTPATIENT
Start: 2024-09-30

## 2025-04-08 ENCOUNTER — OFFICE VISIT (OUTPATIENT)
Dept: OBSTETRICS AND GYNECOLOGY | Facility: CLINIC | Age: 25
End: 2025-04-08

## 2025-04-08 VITALS
DIASTOLIC BLOOD PRESSURE: 84 MMHG | HEIGHT: 60 IN | WEIGHT: 119.2 LBS | BODY MASS INDEX: 23.4 KG/M2 | SYSTOLIC BLOOD PRESSURE: 122 MMHG

## 2025-04-08 DIAGNOSIS — Z01.419 CERVICAL SMEAR, AS PART OF ROUTINE GYNECOLOGICAL EXAMINATION: Primary | ICD-10-CM

## 2025-04-08 DIAGNOSIS — Z01.419 ROUTINE GYNECOLOGICAL EXAMINATION: ICD-10-CM

## 2025-04-08 DIAGNOSIS — Z01.419 WELL WOMAN EXAM: ICD-10-CM

## 2025-04-08 PROBLEM — Z28.39 RUBELLA NON-IMMUNE STATUS, ANTEPARTUM: Status: RESOLVED | Noted: 2023-02-16 | Resolved: 2025-04-08

## 2025-04-08 PROBLEM — O16.9 ELEVATED BLOOD PRESSURE AFFECTING PREGNANCY, ANTEPARTUM: Status: RESOLVED | Noted: 2023-05-12 | Resolved: 2025-04-08

## 2025-04-08 PROBLEM — O09.899 RUBELLA NON-IMMUNE STATUS, ANTEPARTUM: Status: RESOLVED | Noted: 2023-02-16 | Resolved: 2025-04-08

## 2025-04-08 LAB
B-HCG UR QL: NEGATIVE
BILIRUB BLD-MCNC: NEGATIVE MG/DL
CLARITY, POC: CLEAR
COLOR UR: YELLOW
EXPIRATION DATE: NORMAL
GLUCOSE UR STRIP-MCNC: NEGATIVE MG/DL
INTERNAL NEGATIVE CONTROL: NORMAL
INTERNAL POSITIVE CONTROL: NORMAL
KETONES UR QL: NEGATIVE
LEUKOCYTE EST, POC: NEGATIVE
Lab: NORMAL
NITRITE UR-MCNC: NEGATIVE MG/ML
PH UR: 5 [PH] (ref 5–8)
PROT UR STRIP-MCNC: ABNORMAL MG/DL
RBC # UR STRIP: NEGATIVE /UL
SP GR UR: 1.01 (ref 1–1.03)
UROBILINOGEN UR QL: ABNORMAL

## 2025-04-08 RX ORDER — NORETHINDRONE ACETATE AND ETHINYL ESTRADIOL 1MG-20(21)
1 KIT ORAL DAILY
Qty: 84 TABLET | Refills: 5 | Status: SHIPPED | OUTPATIENT
Start: 2025-04-08

## 2025-04-08 NOTE — PROGRESS NOTES
"GYN Annual Exam     CC- Here for annual exam   Chief Complaint   Patient presents with    Gynecologic Exam          Tanna Jones is a 24 y.o.  female who presents for annual well woman exam. No LMP recorded (approximate).    Problems in addition to need for annual: NONE    HPI: Gynecologic Exam        PMHX:  Patient Active Problem List   Diagnosis    Former smoker    HX: Gestational hypertension    HX:  postpartum hemorrhage   ; otherwise none    OB History          1    Para   1    Term   1            AB        Living   1         SAB        IAB        Ectopic        Molar        Multiple   0    Live Births   1                Past Medical History:   Diagnosis Date    Anemia     Anxiety     Cervical dystonia     dx about 1 year ago ()    Concussion     3/2018, hit left side of head on door    Gestational hypertension     Hypertension     resolved       Past Surgical History:   Procedure Laterality Date     SECTION N/A 2023    Procedure:  SECTION PRIMARY;  Surgeon: Roberto Alcala MD;  Location: [facility] LABOR DELIVERY;  Service: Obstetrics/Gynecology;  Laterality: N/A;    HERNIA REPAIR           Current Outpatient Medications:     norethindrone-ethinyl estradiol FE (Blisovi FE ) 1-20 MG-MCG per tablet, Take 1 tablet by mouth Daily. Take active pills continuously, skip blanks, for menstrual suppression, Disp: 84 tablet, Rfl: 5    Chlorcyclizine-Pseudoephed (Stahist AD) 25-60 MG tablet, Take 1 tablet by mouth Every 8 (Eight) Hours As Needed (congestion. may substitute benadryl at night). (Patient not taking: Reported on 2025), Disp: 30 tablet, Rfl: 0    No Known Allergies    Social History     Tobacco Use    Smoking status: Former     Types: Electronic Cigarette    Smokeless tobacco: Former    Tobacco comments:     \"I stopped vaping yesterday\". States stoppede in Dec   Vaping Use    Vaping status: Every Day    Substances: Nicotine    Devices: " "Disposable   Substance Use Topics    Alcohol use: No    Drug use: No       Tanna Jones  reports that she has quit smoking. Her smoking use included electronic cigarette. She has quit using smokeless tobacco..       Family History   Problem Relation Age of Onset    No Known Problems Mother     No Known Problems Father        Review of Systems   Constitutional: Negative.    HENT: Negative.     Eyes: Negative.    Respiratory: Negative.     Cardiovascular: Negative.    Gastrointestinal: Negative.    Endocrine: Negative.    Musculoskeletal: Negative.    Skin: Negative.    Allergic/Immunologic: Negative.    Neurological: Negative.    Hematological: Negative.    Psychiatric/Behavioral: Negative.         Patient reports that she is not currently experiencing any symptoms of urinary incontinence.      noTESTED FOR CHLAMYDIA?    Gardisil indicated? (9-46yo) 0,2,6 months: no    EXAM:  /84   Ht 152.4 cm (60\")   Wt 54.1 kg (119 lb 3.2 oz)   LMP  (Approximate) Comment: 3 weeks ago  Breastfeeding No   BMI 23.28 kg/m²     Physical Exam  Vitals and nursing note reviewed. Exam conducted with a chaperone present.   Constitutional:       General: She is not in acute distress.     Appearance: She is well-developed. She is not diaphoretic.   HENT:      Head: Normocephalic and atraumatic.      Nose: Nose normal.   Eyes:      Extraocular Movements: Extraocular movements intact.   Cardiovascular:      Rate and Rhythm: Normal rate.   Pulmonary:      Effort: Pulmonary effort is normal.   Chest:   Breasts:     Breasts are symmetrical.      Right: Normal. No mass, nipple discharge, skin change or tenderness.      Left: Normal. No mass, nipple discharge, skin change or tenderness.   Abdominal:      General: There is no distension.      Palpations: Abdomen is soft. There is no mass.      Tenderness: There is no abdominal tenderness. There is no guarding.   Genitourinary:     General: Normal vulva.      Pubic Area: No rash.       " Vagina: Normal. No vaginal discharge.      Cervix: Normal.      Uterus: Normal.       Adnexa: Right adnexa normal and left adnexa normal.   Musculoskeletal:         General: No tenderness or deformity. Normal range of motion.      Cervical back: Normal range of motion.   Lymphadenopathy:      Upper Body:      Right upper body: No axillary adenopathy.      Left upper body: No axillary adenopathy.   Skin:     General: Skin is warm and dry.      Coloration: Skin is not pale.      Findings: No erythema or rash.   Neurological:      Mental Status: She is alert and oriented to person, place, and time.   Psychiatric:         Behavior: Behavior normal.         Thought Content: Thought content normal.         Judgment: Judgment normal.         Labs:   Lab Results (last 24 hours)       Procedure Component Value Units Date/Time    POC Pregnancy, Urine [[id number]] Collected: 04/08/25 1305    Specimen: Urine Updated: 04/08/25 1306     HCG, Urine, QL Negative     Lot Number [id number]     Internal Positive Control Passed     Internal Negative Control Passed     Expiration Date 05/26/26    POC Urinalysis Dipstick [[id number]]  (Abnormal) Collected: 04/08/25 1304    Specimen: Urine Updated: 04/08/25 1305     Color Yellow     Clarity, UA Clear     Glucose, UA Negative mg/dL      Bilirubin Negative     Ketones, UA Negative     Specific Gravity  1.010     Blood, UA Negative     pH, Urine 5.0     Protein, POC Trace mg/dL      Urobilinogen, UA 0.2 E.U./dL     Leukocytes Negative     Nitrite, UA Negative                As part of wellness and prevention, the following topics were discussed with the patient where appropriate: healthy weight, substance abuse/misuse, mental health, encouraging self breast exam, and other counseling and guidance done:  Nutrition, physical activity, healthy weight, injury prevention, misuse of tobacco, alcohol and drugs, sexual behavior and STDs, contraception, dental health, mental health, immunizations breast  cancer screening and exams.      Assessment/Plan:     1) GYN annual well woman exam.   2) PAP done today?   3) problems addressed:     * No active hospital problems. *            Follow up prn or one year.    Pt instructed to call for results of any testing done today and that failure to call if she has not heard from us could result in inadequate treatment.  Pt verbalized her understanding.   Diagnoses and all orders for this visit:    1. Cervical smear, as part of routine gynecological examination (Primary)  -     IGP,CtNgTv,rfx Aptima HPV ASCU    2. Routine gynecological examination  -     POC Urinalysis Dipstick  -     POC Pregnancy, Urine  -     IGP,CtNgTv,rfx Aptima HPV ASCU    3. Well woman exam    Other orders  -     norethindrone-ethinyl estradiol FE (Blisovi FE 1/20) 1-20 MG-MCG per tablet; Take 1 tablet by mouth Daily. Take active pills continuously, skip blanks, for menstrual suppression  Dispense: 84 tablet; Refill: 5           RTO Return in about 1 year (around 4/8/2026) for Annual physical.      Roebrto Alcala MD  04/08/25  13:15 EDT

## 2025-04-11 LAB
C TRACH RRNA CVX QL NAA+PROBE: NEGATIVE
CONV .: NORMAL
CYTOLOGIST CVX/VAG CYTO: NORMAL
CYTOLOGY CVX/VAG DOC CYTO: NORMAL
CYTOLOGY CVX/VAG DOC THIN PREP: NORMAL
DX ICD CODE: NORMAL
N GONORRHOEA RRNA CVX QL NAA+PROBE: NEGATIVE
OTHER STN SPEC: NORMAL
SERVICE CMNT-IMP: NORMAL
STAT OF ADQ CVX/VAG CYTO-IMP: NORMAL
T VAGINALIS RRNA SPEC QL NAA+PROBE: NEGATIVE

## 2025-08-26 ENCOUNTER — APPOINTMENT (OUTPATIENT)
Dept: CT IMAGING | Facility: HOSPITAL | Age: 25
End: 2025-08-26

## 2025-08-26 ENCOUNTER — HOSPITAL ENCOUNTER (EMERGENCY)
Facility: HOSPITAL | Age: 25
Discharge: HOME | End: 2025-08-26
Attending: EMERGENCY MEDICINE | Admitting: EMERGENCY MEDICINE

## 2025-08-26 VITALS
BODY MASS INDEX: 23.56 KG/M2 | WEIGHT: 120 LBS | HEART RATE: 84 BPM | OXYGEN SATURATION: 100 % | DIASTOLIC BLOOD PRESSURE: 92 MMHG | TEMPERATURE: 98.7 F | SYSTOLIC BLOOD PRESSURE: 151 MMHG | HEIGHT: 60 IN | RESPIRATION RATE: 20 BRPM

## 2025-08-26 DIAGNOSIS — N20.0 NEPHROLITHIASIS: ICD-10-CM

## 2025-08-26 DIAGNOSIS — N28.89 RENAL SCARRING: ICD-10-CM

## 2025-08-26 DIAGNOSIS — R10.9 RIGHT FLANK PAIN: Primary | ICD-10-CM

## 2025-08-26 LAB
ALBUMIN SERPL-MCNC: 4.4 G/DL (ref 3.5–5.2)
ALBUMIN/GLOB SERPL: 1.6 G/DL
ALP SERPL-CCNC: 49 U/L (ref 39–117)
ALT SERPL W P-5'-P-CCNC: 10 U/L (ref 1–33)
ANION GAP SERPL CALCULATED.3IONS-SCNC: 12.7 MMOL/L (ref 5–15)
AST SERPL-CCNC: 16 U/L (ref 1–32)
BASOPHILS # BLD AUTO: 0.01 10*3/MM3 (ref 0–0.2)
BASOPHILS NFR BLD AUTO: 0.1 % (ref 0–1.5)
BILIRUB SERPL-MCNC: 0.2 MG/DL (ref 0–1.2)
BILIRUB UR QL STRIP: NEGATIVE
BUN SERPL-MCNC: 15.5 MG/DL (ref 6–20)
BUN/CREAT SERPL: 14.2 (ref 7–25)
CALCIUM SPEC-SCNC: 9.4 MG/DL (ref 8.6–10.5)
CHLORIDE SERPL-SCNC: 103 MMOL/L (ref 98–107)
CLARITY UR: CLEAR
CO2 SERPL-SCNC: 22.3 MMOL/L (ref 22–29)
COLOR UR: YELLOW
CREAT SERPL-MCNC: 1.09 MG/DL (ref 0.57–1)
DEPRECATED RDW RBC AUTO: 41.2 FL (ref 37–54)
EGFRCR SERPLBLD CKD-EPI 2021: 72.5 ML/MIN/1.73
EOSINOPHIL # BLD AUTO: 0.04 10*3/MM3 (ref 0–0.4)
EOSINOPHIL NFR BLD AUTO: 0.6 % (ref 0.3–6.2)
ERYTHROCYTE [DISTWIDTH] IN BLOOD BY AUTOMATED COUNT: 12.4 % (ref 12.3–15.4)
GLOBULIN UR ELPH-MCNC: 2.8 GM/DL
GLUCOSE SERPL-MCNC: 96 MG/DL (ref 65–99)
GLUCOSE UR STRIP-MCNC: NEGATIVE MG/DL
HCG SERPL QL: NEGATIVE
HCT VFR BLD AUTO: 37.6 % (ref 34–46.6)
HGB BLD-MCNC: 12.5 G/DL (ref 12–15.9)
HGB UR QL STRIP.AUTO: NEGATIVE
IMM GRANULOCYTES # BLD AUTO: 0 10*3/MM3 (ref 0–0.05)
IMM GRANULOCYTES NFR BLD AUTO: 0 % (ref 0–0.5)
KETONES UR QL STRIP: NEGATIVE
LEUKOCYTE ESTERASE UR QL STRIP.AUTO: NEGATIVE
LIPASE SERPL-CCNC: 58 U/L (ref 13–60)
LYMPHOCYTES # BLD AUTO: 2.16 10*3/MM3 (ref 0.7–3.1)
LYMPHOCYTES NFR BLD AUTO: 32.1 % (ref 19.6–45.3)
MCH RBC QN AUTO: 29.9 PG (ref 26.6–33)
MCHC RBC AUTO-ENTMCNC: 33.2 G/DL (ref 31.5–35.7)
MCV RBC AUTO: 90 FL (ref 79–97)
MONOCYTES # BLD AUTO: 0.43 10*3/MM3 (ref 0.1–0.9)
MONOCYTES NFR BLD AUTO: 6.4 % (ref 5–12)
NEUTROPHILS NFR BLD AUTO: 4.09 10*3/MM3 (ref 1.7–7)
NEUTROPHILS NFR BLD AUTO: 60.8 % (ref 42.7–76)
NITRITE UR QL STRIP: NEGATIVE
PH UR STRIP.AUTO: 7 [PH] (ref 4.5–8)
PLATELET # BLD AUTO: 241 10*3/MM3 (ref 140–450)
PMV BLD AUTO: 10.6 FL (ref 6–12)
POTASSIUM SERPL-SCNC: 3.7 MMOL/L (ref 3.5–5.2)
PROT SERPL-MCNC: 7.2 G/DL (ref 6–8.5)
PROT UR QL STRIP: NEGATIVE
RBC # BLD AUTO: 4.18 10*6/MM3 (ref 3.77–5.28)
SODIUM SERPL-SCNC: 138 MMOL/L (ref 136–145)
SP GR UR STRIP: 1.02 (ref 1–1.03)
UROBILINOGEN UR QL STRIP: NORMAL
WBC NRBC COR # BLD AUTO: 6.73 10*3/MM3 (ref 3.4–10.8)

## 2025-08-26 PROCEDURE — 81003 URINALYSIS AUTO W/O SCOPE: CPT

## 2025-08-26 PROCEDURE — 80053 COMPREHEN METABOLIC PANEL: CPT

## 2025-08-26 PROCEDURE — 99285 EMERGENCY DEPT VISIT HI MDM: CPT | Performed by: EMERGENCY MEDICINE

## 2025-08-26 PROCEDURE — 25510000001 IOPAMIDOL 61 % SOLUTION: Performed by: EMERGENCY MEDICINE

## 2025-08-26 PROCEDURE — 36415 COLL VENOUS BLD VENIPUNCTURE: CPT

## 2025-08-26 PROCEDURE — 85025 COMPLETE CBC W/AUTO DIFF WBC: CPT

## 2025-08-26 PROCEDURE — 83690 ASSAY OF LIPASE: CPT

## 2025-08-26 PROCEDURE — 74177 CT ABD & PELVIS W/CONTRAST: CPT

## 2025-08-26 PROCEDURE — 84703 CHORIONIC GONADOTROPIN ASSAY: CPT

## 2025-08-26 RX ORDER — IOPAMIDOL 612 MG/ML
100 INJECTION, SOLUTION INTRAVASCULAR
Status: COMPLETED | OUTPATIENT
Start: 2025-08-26 | End: 2025-08-26

## 2025-08-26 RX ADMIN — IOPAMIDOL 100 ML: 612 INJECTION, SOLUTION INTRAVENOUS at 13:46

## (undated) DEVICE — SUT GUT CHRM 2/0 CT1 36IN 923H

## (undated) DEVICE — APPL CHLORAPREP W/TINT 26ML ORNG

## (undated) DEVICE — PREP SOL POVIDONE/IODINE BT 4OZ

## (undated) DEVICE — PK C/SECT 40

## (undated) DEVICE — SUT GUT CHRM 0 CTX 36IN 904H BX/36

## (undated) DEVICE — TRY SKINPREP DRYPREP

## (undated) DEVICE — SOL IRR H2O BTL 1000ML STRL

## (undated) DEVICE — SUCTION CANISTER, 1000CC,SAFELINER: Brand: DEROYAL

## (undated) DEVICE — GLV SURG SENSICARE W/ALOE PF LF 7.5 STRL

## (undated) DEVICE — HYDROGEL COATED LATEX URINE METER FOLEY TRAY,16 FR/CH (5.3 MM), 5 ML CATHETER PRE-CONNECTED TO 2000 ML DRAINAGE BAG WITH NEEDLE SAMPLING: Brand: DOVER

## (undated) DEVICE — ANTIBACTERIAL UNDYED BRAIDED (POLYGLACTIN 910), SYNTHETIC ABSORBABLE SUTURE: Brand: COATED VICRYL